# Patient Record
Sex: FEMALE | Race: WHITE | Employment: UNEMPLOYED | URBAN - METROPOLITAN AREA
[De-identification: names, ages, dates, MRNs, and addresses within clinical notes are randomized per-mention and may not be internally consistent; named-entity substitution may affect disease eponyms.]

---
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
## 2021-02-10 ENCOUNTER — OFFICE VISIT (OUTPATIENT)
Dept: FAMILY MEDICINE | Facility: CLINIC | Age: 41
End: 2021-02-10
Payer: COMMERCIAL

## 2021-02-10 VITALS
WEIGHT: 248 LBS | TEMPERATURE: 97.8 F | DIASTOLIC BLOOD PRESSURE: 78 MMHG | OXYGEN SATURATION: 100 % | SYSTOLIC BLOOD PRESSURE: 115 MMHG | HEIGHT: 67 IN | HEART RATE: 101 BPM | BODY MASS INDEX: 38.92 KG/M2

## 2021-02-10 DIAGNOSIS — Z30.432 ENCOUNTER FOR IUD REMOVAL: ICD-10-CM

## 2021-02-10 DIAGNOSIS — Z86.19 HISTORY OF CHLAMYDIA: ICD-10-CM

## 2021-02-10 DIAGNOSIS — F19.21 POLYSUBSTANCE DEPENDENCE IN EARLY, EARLY PARTIAL, SUSTAINED FULL, OR SUSTAINED PARTIAL REMISSION (H): Primary | ICD-10-CM

## 2021-02-10 DIAGNOSIS — Z13.9 SCREENING FOR CONDITION: ICD-10-CM

## 2021-02-10 DIAGNOSIS — R87.618 OTHER ABNORMAL CYTOLOGICAL FINDING OF SPECIMEN FROM CERVIX: ICD-10-CM

## 2021-02-10 DIAGNOSIS — Z86.19 HISTORY OF HPV INFECTION: ICD-10-CM

## 2021-02-10 DIAGNOSIS — G47.10 EXCESSIVE SLEEPINESS: ICD-10-CM

## 2021-02-10 PROBLEM — A74.9 CHLAMYDIA INFECTION: Status: ACTIVE | Noted: 2021-02-10

## 2021-02-10 PROBLEM — F19.10 POLYSUBSTANCE ABUSE (H): Status: ACTIVE | Noted: 2021-02-10

## 2021-02-10 LAB — HCG UR QL: NEGATIVE

## 2021-02-10 PROCEDURE — G0145 SCR C/V CYTO,THINLAYER,RESCR: HCPCS | Performed by: FAMILY MEDICINE

## 2021-02-10 PROCEDURE — 87591 N.GONORRHOEAE DNA AMP PROB: CPT | Performed by: FAMILY MEDICINE

## 2021-02-10 PROCEDURE — 86481 TB AG RESPONSE T-CELL SUSP: CPT | Performed by: FAMILY MEDICINE

## 2021-02-10 PROCEDURE — 87624 HPV HI-RISK TYP POOLED RSLT: CPT | Performed by: FAMILY MEDICINE

## 2021-02-10 PROCEDURE — 81025 URINE PREGNANCY TEST: CPT | Performed by: STUDENT IN AN ORGANIZED HEALTH CARE EDUCATION/TRAINING PROGRAM

## 2021-02-10 PROCEDURE — 86780 TREPONEMA PALLIDUM: CPT | Performed by: FAMILY MEDICINE

## 2021-02-10 PROCEDURE — 86706 HEP B SURFACE ANTIBODY: CPT | Performed by: FAMILY MEDICINE

## 2021-02-10 PROCEDURE — 99000 SPECIMEN HANDLING OFFICE-LAB: CPT | Performed by: FAMILY MEDICINE

## 2021-02-10 PROCEDURE — 36415 COLL VENOUS BLD VENIPUNCTURE: CPT | Performed by: FAMILY MEDICINE

## 2021-02-10 PROCEDURE — 87389 HIV-1 AG W/HIV-1&-2 AB AG IA: CPT | Performed by: FAMILY MEDICINE

## 2021-02-10 PROCEDURE — 86708 HEPATITIS A ANTIBODY: CPT | Performed by: FAMILY MEDICINE

## 2021-02-10 PROCEDURE — 99204 OFFICE O/P NEW MOD 45 MIN: CPT | Mod: 25 | Performed by: STUDENT IN AN ORGANIZED HEALTH CARE EDUCATION/TRAINING PROGRAM

## 2021-02-10 PROCEDURE — 87340 HEPATITIS B SURFACE AG IA: CPT | Performed by: FAMILY MEDICINE

## 2021-02-10 PROCEDURE — 86803 HEPATITIS C AB TEST: CPT | Performed by: FAMILY MEDICINE

## 2021-02-10 PROCEDURE — 87491 CHLMYD TRACH DNA AMP PROBE: CPT | Performed by: FAMILY MEDICINE

## 2021-02-10 PROCEDURE — 86709 HEPATITIS A IGM ANTIBODY: CPT | Performed by: FAMILY MEDICINE

## 2021-02-10 PROCEDURE — 58301 REMOVE INTRAUTERINE DEVICE: CPT | Mod: GC | Performed by: STUDENT IN AN ORGANIZED HEALTH CARE EDUCATION/TRAINING PROGRAM

## 2021-02-10 ASSESSMENT — MIFFLIN-ST. JEOR: SCORE: 1827.55

## 2021-02-10 NOTE — PROGRESS NOTES
Preceptor Attestation:    Patient seen and evaluated in person. I discussed the patient with the resident. I was present for and supervised the entire procedure. I have verified the content of the note, which accurately reflects my assessment of the patient and the plan of care.   Supervising Physician:  Johnnie Chowdhury MD.

## 2021-02-10 NOTE — LETTER
February 15, 2021      Anjali Coker  [address] [facility] [address]        Dear Anjali,    Thank you for getting your care at [facility]. Please see below for your test results. These results are all normal and reassuring. It does show that you are not immune to hepatitis B. I recommend that you complete the immunization series for this.    Resulted Orders   Quantiferon TB Gold Plus   Result Value Ref Range    MTB Quantiferon Result Negative NEG^Negative      Comment:      No interferon gamma response to M.tuberculosis antigens was detected.   Infection with M.tuberculosis is unlikely, however a single negative result   does not exclude infection. In patients at high risk for infection, a second   test should be considered      TB1 Ag minus Nil 0.05 IU/mL    TB2 Ag minus Nil 0.05 IU/mL    Mitogen minus Nil 9.95 IU/mL    NIL Result 0.05 IU/mL   HIV Antigen Antibody Combo   Result Value Ref Range    HIV Antigen Antibody Combo Nonreactive NR^Nonreactive          Comment:      HIV-1 p24 Ag & HIV-1/HIV-2 Ab Not Detected   Hepatitis B surface antigen   Result Value Ref Range    Hep B Surface Agn Nonreactive NR^Nonreactive   Hepatitis B Surface Antibody   Result Value Ref Range    Hepatitis B Surface Antibody 0.00 <8.00 m[IU]/mL      Comment:      Nonreactive, No antibody detected when the value is less than 8.00 m[IU]/mL.   Hepatitis C antibody   Result Value Ref Range    Hepatitis C Antibody Nonreactive NR^Nonreactive      Comment:      Assay performance characteristics have not been established for newborns,   infants, and children     HCG Qualitative Urine (UPT) ([facility])   Result Value Ref Range    HCG Qual Urine NEGATIVE Negative   Hepatitis A Antibody IgG   Result Value Ref Range    Hepatitis A Antibody IgG Nonreactive NR^Nonreactive      Comment:      This assay cannot be used for the diagnosis of acute HAV infection.   Hepatitis A antibody IgM   Result Value Ref Range    Hepatitis A IgM May  Nonreactive NR^Nonreactive      Comment:      IgM anti-HAV not detected. Does not exclude the possibility of recent exposure   to or infection with HAV.     Treponema Abs w Reflex to RPR and Titer   Result Value Ref Range    Treponema Antibodies Nonreactive NR^Nonreactive      Comment:      Methodology Change: Test performed on the [employer] Liaison XL by Treponema   pallidum Total Antibodies Assay as of 3.17.2020.     Pap imaged thin layer screen with HPV - recommended age 30 - 65 years (select HPV order below)   Result Value Ref Range    PAP NIL     Copath Report         Acc#: [id number]   Signed: 2/12/2021 14:03   MR#: [id number]    SPECIMEN/STAIN PROCESS:  Pap imaged thin layer prep screening (Surepath, FocalPoint with guided   screening)       Pap-Cyto x 1, HPV ordered x 1    SOURCE: Cervical, endocervical  ----------------------------------------------------------------   Pap imaged thin layer prep screening (Surepath, FocalPoint with guided   screening)  SPECIMEN ADEQUACY:  Satisfactory for evaluation.  -Transformation zone component absent.    CYTOLOGIC INTERPRETATION:    Negative for intraepithelial lesion or malignancy    Electronically signed by:  VIVEK Monroy (ASCP)    CLINICAL HISTORY:    Papanicolaou Test Limitations:  Cervical cytology is a screening test with   limited sensitivity; regular  screening is critical for cancer prevention; Pap tests are primarily   effective for the diagnosis/prevention of  squamous cell carcinoma, not adenocarcinomas or other cancers.    The technical component of this testing was completed at the [facility], with the professional component performed   at the [facility], [address] [place] [phone] ([phone])       Neisseria gonorrhoeae PCR   Result Value Ref Range    Specimen Descrip Cervical     N Gonorrhea PCR Negative  NEG^Negative      Comment:      Negative for N. gonorrhoeae rRNA by transcription mediated amplification.  A negative result by transcription mediated amplification does not preclude   the presence of N. gonorrhoeae infection because results are dependent on   proper and adequate collection, absence of inhibitors, and sufficient rRNA to   be detected.     Chlamydia trachomatis PCR   Result Value Ref Range    Specimen Description Cervical     Chlamydia Trachomatis PCR Negative NEG^Negative      Comment:      Negative for C. trachomatis rRNA by transcription mediated amplification.  A negative result by transcription mediated amplification does not preclude   the presence of C. trachomatis infection because results are dependent on   proper and adequate collection, absence of inhibitors, and sufficient rRNA to   be detected.         If you have any concerns about these results please call and leave a message for me or send a KAYAKt message to the clinic.    Sincerely,    Debra Beatty MD

## 2021-02-10 NOTE — LETTER
February 17, 2021      Anjali Coker  [address] [facility] [address]        Dear Anjali,    Thank you for getting your care at [facility]. Please see below for your test results. These results are normal and reassuring. Your next pap smear will be in 5 years.    Resulted Orders   Quantiferon TB Gold Plus   Result Value Ref Range    MTB Quantiferon Result Negative NEG^Negative      Comment:      No interferon gamma response to M.tuberculosis antigens was detected.   Infection with M.tuberculosis is unlikely, however a single negative result   does not exclude infection. In patients at high risk for infection, a second   test should be considered      TB1 Ag minus Nil 0.05 IU/mL    TB2 Ag minus Nil 0.05 IU/mL    Mitogen minus Nil 9.95 IU/mL    NIL Result 0.05 IU/mL   HIV Antigen Antibody Combo   Result Value Ref Range    HIV Antigen Antibody Combo Nonreactive NR^Nonreactive          Comment:      HIV-1 p24 Ag & HIV-1/HIV-2 Ab Not Detected   Hepatitis B surface antigen   Result Value Ref Range    Hep B Surface Agn Nonreactive NR^Nonreactive   Hepatitis B Surface Antibody   Result Value Ref Range    Hepatitis B Surface Antibody 0.00 <8.00 m[IU]/mL      Comment:      Nonreactive, No antibody detected when the value is less than 8.00 m[IU]/mL.   Hepatitis C antibody   Result Value Ref Range    Hepatitis C Antibody Nonreactive NR^Nonreactive      Comment:      Assay performance characteristics have not been established for newborns,   infants, and children     HCG Qualitative Urine (UPT) ([facility])   Result Value Ref Range    HCG Qual Urine NEGATIVE Negative   Hepatitis A Antibody IgG   Result Value Ref Range    Hepatitis A Antibody IgG Nonreactive NR^Nonreactive      Comment:      This assay cannot be used for the diagnosis of acute HAV infection.   Hepatitis A antibody IgM   Result Value Ref Range    Hepatitis A IgM May Nonreactive NR^Nonreactive      Comment:      IgM anti-HAV not detected. Does not exclude  the possibility of recent exposure   to or infection with HAV.     Treponema Abs w Reflex to RPR and Titer   Result Value Ref Range    Treponema Antibodies Nonreactive NR^Nonreactive      Comment:      Methodology Change: Test performed on the [employer] Liaison XL by Treponema   pallidum Total Antibodies Assay as of 3.17.2020.     Pap imaged thin layer screen with HPV - recommended age 30 - 65 years (select HPV order below)   Result Value Ref Range    PAP NIL     Copath Report         Acc#: [id number]   Signed: 2/12/2021 14:03   MR#: [id number]    SPECIMEN/STAIN PROCESS:  Pap imaged thin layer prep screening (Surepath, FocalPoint with guided   screening)       Pap-Cyto x 1, HPV ordered x 1    SOURCE: Cervical, endocervical  ----------------------------------------------------------------   Pap imaged thin layer prep screening (Surepath, FocalPoint with guided   screening)  SPECIMEN ADEQUACY:  Satisfactory for evaluation.  -Transformation zone component absent.    CYTOLOGIC INTERPRETATION:    Negative for intraepithelial lesion or malignancy    Electronically signed by:  VIVEK Monroy (ASCP)    CLINICAL HISTORY:    Papanicolaou Test Limitations:  Cervical cytology is a screening test with   limited sensitivity; regular  screening is critical for cancer prevention; Pap tests are primarily   effective for the diagnosis/prevention of  squamous cell carcinoma, not adenocarcinomas or other cancers.    The technical component of this testing was completed at the [facility], with the professional component performed   at the [facility], [address] [place] [phone] ([phone])       HPV High Risk Types DNA Cervical   Result Value Ref Range    HPV Source SurePath     HPV 16 DNA Negative NEG^Negative    HPV 18 DNA Negative NEG^Negative    Other HR HPV Negative NEG^Negative    Final  Diagnosis This patient's sample is negative for HPV DNA.       Comment:      This test was developed and its performance characteristics determined by the   [facility]. It   has not been cleared or approved by the FDA. The laboratory is regulated under   CLIA as qualified to perform high-complexity testing. This test is used for   clinical purposes. It should not be regarded as investigational or for   research.  (Note)  METHODOLOGY:  The Roche luís 4800 system uses automated extraction,   simultaneous amplification of HPV (L1 region) and beta-globin,    followed by  real time detection of fluorescent labeled HPV and beta   globin using specific oligonucleotide probes . The test specifically   identifies types HPV 16 DNA and HPV 18 DNA while concurrently   detecting the rest of the high risk types (31, 33, 35, 39, 45, 51,   52, 56, 58, 59, 66 or 68).  COMMENTS:  This test is not intended for use as a screening device   for women under age 30 with normal cervical cytology.  Results should   be correl ated with cytologic and histologic findings. Close clinical   followup is recommended.      Specimen Description Cervical Cells    Neisseria gonorrhoeae PCR   Result Value Ref Range    Specimen Descrip Cervical     N Gonorrhea PCR Negative NEG^Negative      Comment:      Negative for N. gonorrhoeae rRNA by transcription mediated amplification.  A negative result by transcription mediated amplification does not preclude   the presence of N. gonorrhoeae infection because results are dependent on   proper and adequate collection, absence of inhibitors, and sufficient rRNA to   be detected.     Chlamydia trachomatis PCR   Result Value Ref Range    Specimen Description Cervical     Chlamydia Trachomatis PCR Negative NEG^Negative      Comment:      Negative for C. trachomatis rRNA by transcription mediated amplification.  A negative result by transcription mediated  amplification does not preclude   the presence of C. trachomatis infection because results are dependent on   proper and adequate collection, absence of inhibitors, and sufficient rRNA to   be detected.         If you have any concerns about these results please call and leave a message for me or send a MyChart message to the clinic.    Sincerely,    Debra Beatty MD

## 2021-02-11 LAB
C TRACH DNA SPEC QL NAA+PROBE: NEGATIVE
HAV IGG SER QL IA: NONREACTIVE
HAV IGM SERPL QL IA: NONREACTIVE
HBV SURFACE AB SERPL IA-ACNC: 0 M[IU]/ML
HBV SURFACE AG SERPL QL IA: NONREACTIVE
HCV AB SERPL QL IA: NONREACTIVE
HIV 1+2 AB+HIV1 P24 AG SERPL QL IA: NONREACTIVE
N GONORRHOEA DNA SPEC QL NAA+PROBE: NEGATIVE
SPECIMEN SOURCE: NORMAL
SPECIMEN SOURCE: NORMAL
T PALLIDUM AB SER QL: NONREACTIVE

## 2021-02-12 ENCOUNTER — TELEPHONE (OUTPATIENT)
Dept: FAMILY MEDICINE | Facility: CLINIC | Age: 41
End: 2021-02-12

## 2021-02-12 LAB
COPATH REPORT: NORMAL
GAMMA INTERFERON BACKGROUND BLD IA-ACNC: 0.05 IU/ML
M TB IFN-G CD4+ BCKGRND COR BLD-ACNC: 9.95 IU/ML
M TB TUBERC IFN-G BLD QL: NEGATIVE
MITOGEN IGNF BCKGRD COR BLD-ACNC: 0.05 IU/ML
MITOGEN IGNF BCKGRD COR BLD-ACNC: 0.05 IU/ML
PAP: NORMAL

## 2021-02-12 NOTE — TELEPHONE ENCOUNTER
Rn spoke to Adela BETANCOURT at [facility] who was requesting results of patient's urine pregnancy test. RN informed her it did come back negative and it looks like we were waiting for a few other labs from 2/10 appointment. She is requesting results be faxed after interpretation from provider to [phone]. Routing to provider.   Shaneka Andrade RN

## 2021-02-16 ENCOUNTER — ANCILLARY PROCEDURE (OUTPATIENT)
Dept: ULTRASOUND IMAGING | Facility: CLINIC | Age: 41
End: 2021-02-16
Attending: FAMILY MEDICINE
Payer: COMMERCIAL

## 2021-02-16 DIAGNOSIS — Z30.432 ENCOUNTER FOR IUD REMOVAL: ICD-10-CM

## 2021-02-16 LAB
FINAL DIAGNOSIS: NORMAL
HPV HR 12 DNA CVX QL NAA+PROBE: NEGATIVE
HPV16 DNA SPEC QL NAA+PROBE: NEGATIVE
HPV18 DNA SPEC QL NAA+PROBE: NEGATIVE
RADIOLOGIST FLAGS: ABNORMAL
SPECIMEN DESCRIPTION: NORMAL
SPECIMEN SOURCE CVX/VAG CYTO: NORMAL

## 2021-02-16 PROCEDURE — 76830 TRANSVAGINAL US NON-OB: CPT | Mod: GC | Performed by: RADIOLOGY

## 2021-02-16 PROCEDURE — 76856 US EXAM PELVIC COMPLETE: CPT | Mod: GC | Performed by: RADIOLOGY

## 2021-02-17 DIAGNOSIS — T83.39XA RETAINED INTRAUTERINE CONTRACEPTIVE DEVICE (IUD): Primary | ICD-10-CM

## 2021-02-17 NOTE — RESULT ENCOUNTER NOTE
Please call patient with the following message:  Ignacio Alba, the ultrasound did show part of the IUD inside your cervix. I have placed a referral to GYN for removal of this fragment.  Thanks Debra Beatty MD

## 2021-03-03 ENCOUNTER — TELEPHONE (OUTPATIENT)
Dept: FAMILY MEDICINE | Facility: CLINIC | Age: 41
End: 2021-03-03

## 2021-03-07 ENCOUNTER — HEALTH MAINTENANCE LETTER (OUTPATIENT)
Age: 41
End: 2021-03-07

## 2021-03-10 VITALS — BODY MASS INDEX: 38.92 KG/M2 | WEIGHT: 248 LBS | HEIGHT: 67 IN

## 2021-03-10 RX ORDER — ARIPIPRAZOLE 10 MG/1
10 TABLET ORAL AT BEDTIME
Status: ON HOLD | COMMUNITY
End: 2021-04-11

## 2021-03-10 RX ORDER — VALACYCLOVIR HYDROCHLORIDE 500 MG/1
500 TABLET, FILM COATED ORAL EVERY EVENING
COMMUNITY

## 2021-03-10 RX ORDER — ATOMOXETINE 80 MG/1
80 CAPSULE ORAL DAILY
Status: ON HOLD | COMMUNITY
End: 2021-04-11

## 2021-03-10 RX ORDER — ESCITALOPRAM OXALATE 20 MG/1
20 TABLET ORAL AT BEDTIME
Status: ON HOLD | COMMUNITY
End: 2021-04-11

## 2021-03-10 ASSESSMENT — MIFFLIN-ST. JEOR: SCORE: 1832.67

## 2021-03-10 NOTE — PROGRESS NOTES
"Anjali Coker is a 41 year old female being evaluated via a billable telephone visit.     \"This telephone visit will be conducted via a call between you and your physician/provider. We have found that certain health care needs can be provided without the need for an in-person visit or physical exam.  This service lets us provide the care you need with a telephone conversation.  If a prescription is necessary we can send it directly to your pharmacy.  If lab work is needed we can place an order for that and you can then stop by our lab to have the test done at a later time.\"    Telephone visits are billed at different rates depending on your insurance coverage.  Please reach out to your insurance provider with any questions.    Patient has given verbal consent for  a Telephone visit? Yes    What telephone number would you like your provider to contact at at: Please call this number [phone] and staff will provide number to reach patient.    How would you like to obtain your AVS? Luz Styles MA    Telephone Visit Details:     Telephone Visit Start Time: 9:04 AM    Telephone Visit End Time:9:38 AM     Chief complaint: Consultation requested by Debra Beatty MD for evaluation of excessive daytime sleepiness    History of Present Illness: 41-year-old female with past medical history notable for bipolar depression, ADHD, substance abuse.  She is currently in residential treatment and has been sober from alcohol, meth, marijuana since December.  She reports many years of excessive daytime sleepiness.  She would doze off at work during quiet activities.  She has also been sleepy behind the wheel.  She was pulled over the last fall for crossing the median because of sleepiness.  She is currently getting 8-hour sleep opportunity nightly and despite this continues to be sleepy.  She feels she wakes up 2-4 times a night.  Usually once to go to the bathroom.  She has been told about very loud snoring.  Her "  has told her about apnea in the past.  She recalls waking up with a sense of gasping when she was pregnant.  But not lately.  She does wake up with dry mouth and sore throat and frequent headaches.  She is also been told about sleep talking but no sleepwalking or dream enactment behavior.    During the day she drinks the equivalent of about 4 cups of coffee plus some tea or sodas.  She also is currently on atomoxetine for ADHD.  She is taking naps 2-3 times a week for 30 to 60 minutes or so.  Naps are refreshing.  There is no cataplexy, hypnagogic hallucinations or sleep paralysis.  No restless legs.    She denies excessive shortness of breath during the day.  She does have some shortness of breath with activity that she feels is related to her fitness level.    [place] Sleepiness Scale   Sitting and reading: Moderate chance of dozing   Watching TV: Moderate chance of dozing   Sitting, inactive in a public place (e.g. a theatre or a meeting): Moderate chance of dozing   As a passenger in a car for an hour without a break: Slight chance of dozing   Lying down to rest in the afternoon when circumstances permit: High chance of dozing   Sitting and talking to someone: Moderate chance of dozing   Sitting quietly after a lunch without alcohol: High chance of dozing   In a car, while stopped for a few minutes in traffic: High chance of dozing   Total score - [place]: 18   (Less than 10 normal)    Insomnia Severity Scale  LIANNA Total Score: 14  Total score categories:  0-7 = No clinically significant insomnia   8-14 = Subthreshold insomnia   15-21 = Clinical insomnia (moderate severity)  22-28 = Clinical insomnia (severe)    STOP-BANG  Loud Snore   1  Excessively Tired/Sleepy   1  Observed apnea   1  Hypertension   0  BMI> 35 kg/m2   1  Age >50   0  Neck >16 in/40cm   0  Male Gender   0  Total =   4  (0-2 low, 3-4 intermediate, 5-8 high risk of ROMANA)      Past Medical History:   Diagnosis Date     Bipolar disorder (H)       Depressive disorder      Herpes 2000       Allergies   Allergen Reactions     Vistaril [Hydroxyzine]        Current Outpatient Medications   Medication     ARIPiprazole (ABILIFY) 15 MG ODT     atomoxetine (STRATTERA) 80 MG capsule     escitalopram (LEXAPRO) 20 MG tablet     valACYclovir (VALTREX) 500 MG tablet     No current facility-administered medications for this visit.        Social History     Socioeconomic History     Marital status: Single     Spouse name: Not on file     Number of children: Not on file     Years of education: Not on file     Highest education level: Not on file   Occupational History     Not on file   Social Needs     Financial resource strain: Not on file     Food insecurity     Worry: Not on file     Inability: Not on file     Transportation needs     Medical: Not on file     Non-medical: Not on file   Tobacco Use     Smoking status: Former Smoker     Packs/day: 1.00     Years: 20.00     Pack years: 20.00     Types: Cigarettes     Start date: 3/14/1996     Quit date: 2020     Years since quittin.1     Smokeless tobacco: Never Used   Substance and Sexual Activity     Alcohol use: Not Currently     Comment: sober since 2020     Drug use: Not Currently     Types: Marijuana, Methamphetamines     Comment: sober since 2020     Sexual activity: Not Currently     Partners: Male     Birth control/protection: I.U.D.   Lifestyle     Physical activity     Days per week: Not on file     Minutes per session: Not on file     Stress: Not on file   Relationships     Social connections     Talks on phone: Not on file     Gets together: Not on file     Attends Restorationism service: Not on file     Active member of club or organization: Not on file     Attends meetings of clubs or organizations: Not on file     Relationship status: Not on file     Intimate partner violence     Fear of current or ex partner: Not on file     Emotionally abused: Not on file     Physically abused: Not on file     " Forced sexual activity: Not on file   Other Topics Concern     Not on file   Social History Narrative     Not on file       Family History   Problem Relation Age of Onset     Cervical Cancer Mother      Chronic Obstructive Pulmonary Disease Mother      Sleep Apnea Mother      Dementia Maternal Grandmother      Sleep Apnea Maternal Grandmother      Lung Cancer Maternal Grandfather      Dementia Paternal Grandmother      Dementia Paternal Grandfather        Review of Systems: Positve for depression and anxiety and per HPI, otherwise comprehensive review of systems is negative.    EXAM:  Ht 1.702 m (5' 7.01\")   Wt 112.5 kg (248 lb)   BMI 38.83 kg/m    GENERAL: Aalert and no distress  RESP: No audible wheeze, cough, or visible cyanosis.  Able to speak in complete sentences.  PSYCH: Mentation appears normal, affect normal, judgement and insight intact, normal speech.     TSH 8/9/2017 normal 0.73    ASSESSMENT:  41-year-old female with excessive daytime sleepiness despite adequate sleep opportunity, snoring and observed apneas, obesity, history of substance abuse currently sober.  Bipolar depression is under reasonably good control at this time.  She also has a history of ADD and is on medication for this.  She has significant increased risk for obstructive sleep apnea with family history and symptoms and body habitus.    PLAN:  We reviewed the pathophysiology of obstructive sleep apnea, testing options, treatment options.  We also reviewed the importance of treating sleep apnea given her symptoms.  She is open to a trial of CPAP.  Orders generated for in lab polysomnography.  I will be contacting patient via [employer] with results when they become available.  We also discussed the impact of weight management on reducing severity and sometimes curing sleep apnea.  This could be reevaluated in the future after significant and sustained weight loss.    50 minutes spent on the date of the encounter doing chart review, " history and exam, documentation and further activities as noted above    Janet Bonilla M.D.  Pulmonary/Critical Care/Sleep Medicine    [facility]   Fl[address], [address]   [address] [place] [address]. [place] [address]   Appointments: [phone]    The above note was dictated using voice recognition software and may include typographical errors. Please contact the author for any clarifications.

## 2021-03-11 ENCOUNTER — VIRTUAL VISIT (OUTPATIENT)
Dept: SLEEP MEDICINE | Facility: CLINIC | Age: 41
End: 2021-03-11
Payer: COMMERCIAL

## 2021-03-11 DIAGNOSIS — G47.30 OBSERVED SLEEP APNEA: ICD-10-CM

## 2021-03-11 DIAGNOSIS — E66.09 CLASS 2 OBESITY DUE TO EXCESS CALORIES WITH BODY MASS INDEX (BMI) OF 38.0 TO 38.9 IN ADULT, UNSPECIFIED WHETHER SERIOUS COMORBIDITY PRESENT: ICD-10-CM

## 2021-03-11 DIAGNOSIS — E66.812 CLASS 2 OBESITY DUE TO EXCESS CALORIES WITH BODY MASS INDEX (BMI) OF 38.0 TO 38.9 IN ADULT, UNSPECIFIED WHETHER SERIOUS COMORBIDITY PRESENT: ICD-10-CM

## 2021-03-11 DIAGNOSIS — G47.10 EXCESSIVE SLEEPINESS: Primary | ICD-10-CM

## 2021-03-11 DIAGNOSIS — R06.83 SNORING: ICD-10-CM

## 2021-03-11 PROCEDURE — 99204 OFFICE O/P NEW MOD 45 MIN: CPT | Mod: 95 | Performed by: INTERNAL MEDICINE

## 2021-03-11 SDOH — HEALTH STABILITY: MENTAL HEALTH: HOW OFTEN DO YOU HAVE 6 OR MORE DRINKS ON ONE OCCASION?: NOT ASKED

## 2021-03-11 SDOH — HEALTH STABILITY: MENTAL HEALTH: HOW OFTEN DO YOU HAVE A DRINK CONTAINING ALCOHOL?: NOT ASKED

## 2021-03-11 SDOH — HEALTH STABILITY: MENTAL HEALTH: HOW MANY STANDARD DRINKS CONTAINING ALCOHOL DO YOU HAVE ON A TYPICAL DAY?: NOT ASKED

## 2021-03-11 NOTE — PATIENT INSTRUCTIONS
"MY TREATMENT INFORMATION FOR SLEEP APNEA-  Anjali Coker    DOCTOR : Janet Bonilla MD    Am I having a sleep study at a sleep center? Ordered  --->Due to insurance clearance delays, you will be contacted within 2-4 weeks to schedule    Am I having a home sleep study? Not Ordered  --->Watch the video for the device you are using:  /drop off device-   Https://www.Crossfader.com/watch?v=yGGFBdELGhk  Disposable device sent out require phone/computer application-   https://www.Crossfader.com/watch?v=BCce_vbiwxE    Please do not drive if sleepy.    Frequently asked questions:  1. What is Obstructive Sleep Apnea (ROMANA)? ROMANA is the most common type of sleep apnea. Apnea means, \"without breath.\"  Apnea is most often caused by narrowing or collapse of the upper airway as muscles relax during sleep.   Almost everyone has occasional apneas. Most people with sleep apnea have had brief interruptions at night frequently for many years.  The severity of sleep apnea is related to how frequent and severe the events are.   2. What are the consequences of ROMANA? Symptoms include: feeling sleepy during the day, snoring loudly, gasping or stopping of breathing, trouble sleeping, and occasionally morning headaches or heartburn at night.  Sleepiness can be serious and even increase the risk of falling asleep while driving. Other health consequences may include development of high blood pressure and other cardiovascular disease in persons who are susceptible. Untreated ROMANA  can contribute to heart disease, stroke and diabetes.   3. What are the treatment options? In most situations, sleep apnea is a lifelong disease that must be managed with daily therapy. Medications are not effective for sleep apnea and surgery is generally not considered until other therapies have been tried. Your treatment is your choice . Continuous Positive Airway (CPAP) works right away and is the therapy that is effective in nearly everyone. An oral device to " hold your jaw forward is usually the next most reliable option. Other options include postioning devices (to keep you off your back), weight loss, and surgery including a tongue pacing device. There is more detail about some of these options below.  4. Are my sleep studies covered by insurance? Although we will request verification of coverage, we advise you also check in advance of the study to ensure there is coverage.    Important tips for those choosing CPAP and similar devices   Know your equipment:  CPAP is continuous positive airway pressure that prevents obstructive sleep apnea by keeping the throat from collapsing while you are sleeping. In most cases, the device is  smart  and can slowly self-adjusts if your throat collapses and keeps a record every day of how well you are treated-this information is available to you and your care team.  BPAP is bilevel positive airway pressure that keeps your throat open and also assists each breath with a pressure boost to maintain adequate breathing.  Special kinds of BPAP are used in patients who have inadequate breathing from lung or heart disease. In most cases, the device is  smart  and can slowly self-adjusts to assist breathing. Like CPAP, the device keeps a record of how well you are treated.  Your mask is your connection to the device. You get to choose what feels most comfortable and the staff will help to make sure if fits. Here: are some examples of the different masks that are available:       Key points to remember on your journey with sleep apnea:  1. Sleep study.  PAP devices often need to be adjusted during a sleep study to show that they are effective and adjusted right.  2. Good tips to remember: Try wearing just the mask during a quiet time during the day so your body adapts to wearing it. A humidifier is recommended for comfort in most cases to prevent drying of your nose and throat. Allergy medication from your provider may help you if you are  having nasal congestion.  3. Getting settled-in. It takes more than one night for most of us to get used to wearing a mask. Try wearing just the mask during a quiet time during the day so your body adapts to wearing it. A humidifier is recommended for comfort in most cases. Our team will work with you carefully on the first day and will be in contact within 4 days and again at 2 and 4 weeks for advice and remote device adjustments. Your therapy is evaluated by the device each day.   4. Use it every night. The more you are able to sleep naturally for 7-8 hours, the more likely you will have good sleep and to prevent health risks or symptoms from sleep apnea. Even if you use it 4 hours it helps. Occasionally all of us are unable to use a medical therapy, in sleep apnea, it is not dangerous to miss one night.   5. Communicate. Call our skilled team on the number provided on the first day if your visit for problems that make it difficult to wear the device. Over 2 out of 3 patients can learn to wear the device long-term with help from our team. Remember to call our team or your sleep providers if you are unable to wear the device as we may have other solutions for those who cannot adapt to mask CPAP therapy. It is recommended that you sleep your sleep provider within the first 3 months and yearly after that if you are not having problems.   6. Use it for your health. We encourage use of CPAP masks during daytime quiet periods to allow your face and brain to adapt to the sensation of CPAP so that it will be a more natural sensation to awaken to at night or during naps. This can be very useful during the first few weeks or months of adapting to CPAP though it does not help medically to wear CPAP during wakefulness and  should not be used as a strategy just to meet guidelines.  7. Take care of your equipment. Make sure you clean your mask and tubing using directions every day and that your filter and mask are replaced as  recommended or if they are not working.     BESIDES CPAP, WHAT OTHER THERAPIES ARE THERE?    Positioning Device  Positioning devices are generally used when sleep apnea is mild and only occurs on your back.This example shows a pillow that straps around the waist. It may be appropriate for those whose sleep study shows milder sleep apnea that occurs primarily when lying flat on one's back. Preliminary studies have shown benefit but effectiveness at home may need to be verified by a home sleep test. These devices are generally not covered by medical insurance.  Examples of devices that maintain sleeping on the back to prevent snoring and mild sleep apnea.    Belt type body positioner  http://Wowsai/    Electronic reminder  http://nightshifttherapy.com/  http://[email or web address] Remedy.Traffline.au/      Oral Appliance  What is oral appliance therapy?  An oral appliance device fits on your teeth at night like a retainer used after having braces. The device is made by a specialized dentist and requires several visits over 1-2 months before a manufactured device is made to fit your teeth and is adjusted to prevent your sleep apnea. Once an oral device is working properly, snoring should be improved. A home sleep test may be recommended at that time if to determine whether the sleep apnea is adequately treated.       Some things to remember:  -Oral devices are often, but not always, covered by your medical insurance. Be sure to check with your insurance provider.   -If you are referred for oral therapy, you will be given a list of specialized dentists to consider or you may choose to visit the Web site of the American Academy of Dental Sleep Medicine  -Oral devices are less likely to work if you have severe sleep apnea or are extremely overweight.     More detailed information  An oral appliance is a small acrylic device that fits over the upper and lower teeth  (similar to a retainer or a mouth guard). This device slightly moves jaw  forward, which moves the base of the tongue forward, opens the airway, improves breathing for effective treat snoring and obstructive sleep apnea in perhaps 7 out of 10 people .  The best working devices are custom-made by a dental device  after a mold is made of the teeth 1, 2, 3.  When is an oral appliance indicated?  Oral appliance therapy is recommended as a first-line treatment for patients with primary snoring, mild sleep apnea, and for patients with moderate sleep apnea who prefer appliance therapy to use of CPAP4, 5. Severity of sleep apnea is determined by sleep testing and is based on the number of respiratory events per hour of sleep.   How successful is oral appliance therapy?  The success rate of oral appliance therapy in patients with mild sleep apnea is 75-80% while in patients with moderate sleep apnea it is 50-70%. The chance of success in patients with severe sleep apnea is 40-50%. The research also shows that oral appliances have a beneficial effect on the cardiovascular health of ROMANA patients at the same magnitude as CPAP therapy7.  Oral appliances should be a second-line treatment in cases of severe sleep apnea, but if not completely successful then a combination therapy utilizing CPAP plus oral appliance therapy may be effective. Oral appliances tend to be effective in a broad range of patients although studies show that the patients who have the highest success are females, younger patients, those with milder disease, and less severe obesity. 3, 6.   Finding a dentist that practices dental sleep medicine  Specific training is available through the American Academy of Dental Sleep Medicine for dentists interested in working in the field of sleep. To find a dentist who is educated in the field of sleep and the use of oral appliances, near you, visit the Web site of the American Academy of Dental Sleep Medicine.    References  1. domo Dykes al. Objectively measured vs  self-reported compliance during oral appliance therapy for sleep-disordered breathing. Chest 2013; 144(5): 5630-4538.  2. Emeli, et al. Objective measurement of compliance during oral appliance therapy for sleep-disordered breathing. Thorax 2013; 68(1): 91-96.  3. Lashonda et al. Mandibular advancement devices in 620 men and women with ROMANA and snoring: tolerability and predictors of treatment success. Chest 2004; 125: 1876-6141.  4. Roby et al. Oral appliances for snoring and ROMANA: a review. Sleep 2006; 29: 244-262.  5. Criselda et al. Oral appliance treatment for ROMANA: an update. J Clin Sleep Med 2014; 10(2): 215-227.  6. Romina et al. Predictors of OSAH treatment outcome. J Dent Res 2007; 86: 8342-9696.      Weight Loss:    Weight loss is a long-term strategy that may improve sleep apnea in some patients.    Weight management is a personal decision and the decision should be based on your interest and the potential benefits.  If you are interested in exploring weight loss strategies, the following discussion covers the impact on weight loss on sleep apnea and the approaches that may be successful.    Being overweight does not necessarily mean you will have health consequences.  Those who have BMI over 35 or over 27 with existing medical conditions carries greater risk.   Weight loss decreases severity of sleep apnea in most people with obesity. For those with mild obesity who have developed snoring with weight gain, even 15-30 pound weight loss can improve and occasionally eliminate sleep apnea.  Structured and life-long dietary and health habits are necessary to lose weight and keep healthier weight levels.     Though there may be significant health benefits from weight loss, long-term weight loss is very difficult to achieve- studies show success with dietary management in less than 10% of people. In addition, substantial weight loss may require years of dietary control and may be difficult if  patients have severe obesity. In these cases, surgical management may be considered.  Finally, older individuals who have tolerated obesity without health complications may be less likely to benefit from weight loss strategies.        Your BMI is Body mass index is 38.83 kg/m .  Weight management is a personal decision.  If you are interested in exploring weight loss strategies, the following discussion covers the approaches that may be successful. Body mass index (BMI) is one way to tell whether you are at a healthy weight, overweight, or obese. It measures your weight in relation to your height.  A BMI of 18.5 to 24.9 is in the healthy range. A person with a BMI of 25 to 29.9 is considered overweight, and someone with a BMI of 30 or greater is considered obese. More than two-thirds of American adults are considered overweight or obese.  Being overweight or obese increases the risk for further weight gain. Excess weight may lead to heart disease and diabetes.  Creating and following plans for healthy eating and physical activity may help you improve your health.  Weight control is part of healthy lifestyle and includes exercise, emotional health, and healthy eating habits. Careful eating habits lifelong are the mainstay of weight control. Though there are significant health benefits from weight loss, long-term weight loss with diet alone may be very difficult to achieve- studies show long-term success with dietary management in less than 10% of people. Attaining a healthy weight may be especially difficult to achieve in those with severe obesity. In some cases, medications, devices and surgical management might be considered.  What can you do?  If you are overweight or obese and are interested in methods for weight loss, you should discuss this with your provider.     Consider reducing daily calorie intake by 500 calories.     Keep a food journal.     Avoiding skipping meals, consider cutting portions  instead.    Diet combined with exercise helps maintain muscle while optimizing fat loss. Strength training is particularly important for building and maintaining muscle mass. Exercise helps reduce stress, increase energy, and improves fitness. Increasing exercise without diet control, however, may not burn enough calories to loose weight.       Start walking three days a week 10-20 minutes at a time    Work towards walking thirty minutes five days a week     Eventually, increase the speed of your walking for 1-2 minutes at time    In addition, we recommend that you review healthy lifestyles and methods for weight loss available through the National Institutes of Health patient information sites:  http://win.niddk.nih.gov/publications/index.htm    And look into health and wellness programs that may be available through your health insurance provider, employer, local community center, or seema club.    Weight management plan: Keep up the work with exercise and diet      Surgery:    Surgery for obstructive sleep apnea is considered generally only when other therapies fail to work. Surgery may be discussed with you if you are having a difficult time tolerating CPAP and or when there is an abnormal structure that requires surgical correction.  Nose and throat surgeries often enlarge the airway to prevent collapse.  Most of these surgeries create pain for 1-2 weeks and up to half of the most common surgeries are not effective throughout life.  You should carefully discuss the benefits and drawbacks to surgery with your sleep provider and surgeon to determine if it is the best solution for you.   More information  Surgery for ROMANA is directed at areas that are responsible for narrowing or complete obstruction of the airway during sleep.  There are a wide range of procedures available to enlarge and/or stabilize the airway to prevent blockage of breathing in the three major areas where it can occur: the palate, tongue, and  nasal regions.  Successful surgical treatment depends on the accurate identification of the factors responsible for obstructive sleep apnea in each person.  A personalized approach is required because there is no single treatment that works well for everyone.  Because of anatomic variation, consultation with an examination by a sleep surgeon is a critical first step in determining what surgical options are best for each patient.  In some cases, examination during sedation may be recommended in order to guide the selection of procedures.  Patients will be counseled about risks and benefits as well as the typical recovery course after surgery. Surgery is typically not a cure for a person s ROMANA.  However, surgery will often significantly improve one s ROMANA severity (termed  success rate ).  Even in the absence of a cure, surgery will decrease the cardiovascular risk associated with OSA7; improve overall quality of life8 (sleepiness, functionality, sleep quality, etc).      Palate Procedures:  Patients with ROMANA often have narrowing of their airway in the region of their tonsils and uvula.  The goals of palate procedures are to widen the airway in this region as well as to help the tissues resist collapse.  Modern palate procedure techniques focus on tissue conservation and soft tissue rearrangement, rather than tissue removal.  Often the uvula is preserved in this procedure. Residual sleep apnea is common in patient after pharyngoplasty with an average reduction in sleep apnea events of 33%2.      Tongue Procedures:  ExamWhile patients are awake, the muscles that surround the throat are active and keep this region open for breathing. These muscles relax during sleep, allowing the tongue and other structures to collapse and block breathing.  There are several different tongue procedures available.  Selection of a tongue base procedure depends on characteristics seen on physical exam.  Generally, procedures are aimed at  removing bulky tissues in this area or preventing the back of the tongue from falling back during sleep.  Success rates for tongue surgery range from 50-62%3.    Hypoglossal Nerve Stimulation:  Hypoglossal nerve stimulation has recently received approval from the [place] Food and Drug Administration for the treatment of obstructive sleep apnea.  This is based on research showing that the system was safe and effective in treating sleep apnea6.  Results showed that the median AHI score decreased 68%, from 29.3 to 9.0. This therapy uses an implant system that senses breathing patterns and delivers mild stimulation to airway muscles, which keeps the airway open during sleep.  The system consists of three fully implanted components: a small generator (similar in size to a pacemaker), a breathing sensor, and a stimulation lead.  Using a small handheld remote, a patient turns the therapy on before bed and off upon awakening.    Candidates for this device must be greater than 22 years of age, have moderate to severe ROMANA (AHI between 20-65), BMI less than 32, have tried CPAP/oral appliance without success, and have appropriate upper airway anatomy (determined by a sleep endoscopy performed by Dr. Justice).    Hypoglossal Nerve Stimulation Pathway:    The sleep surgeon s office will work with the patient through the insurance prior-authorization process (including communications and appeals).    Nasal Procedures:  Nasal obstruction can interfere with nasal breathing during the day and night.  Studies have shown that relief of nasal obstruction can improve the ability of some patients to tolerate positive airway pressure therapy for obstructive sleep apnea1.  Treatment options include medications such as nasal saline, topical corticosteroid and antihistamine sprays, and oral medications such as antihistamines or decongestants. Non-surgical treatments can include external nasal dilators for selected patients. If these are  not successful by themselves, surgery can improve the nasal airway either alone or in combination with these other options.      Combination Procedures:  Combination of surgical procedures and other treatments may be recommended, particularly if patients have more than one area of narrowing or persistent positional disease.  The success rate of combination surgery ranges from 66-80%2,3.    References  1. Joe SIMS. The Role of the Nose in Snoring and Obstructive Sleep Apnoea: An Update.  Eur Arch Otorhinolaryngol. 2011; 268: 1365-73.  2.  Divina SM; Pan JA; More JR; Pallanch JF; Yulisa MB; Yoav SG; Eula AKBAR. Surgical modifications of the upper airway for obstructive sleep apnea in adults: a systematic review and meta-analysis. SLEEP 2010;33(10):8178-8920. Aas SILVERIO. Hypopharyngeal surgery in obstructive sleep apnea: an evidence-based medicine review.  Arch Otolaryngol Head Neck Surg. 2006 Feb;132(2):206-13.  3. Jett YH1, Evelyn Y, Osmar GABE. The efficacy of anatomically based multilevel surgery for obstructive sleep apnea. Otolaryngol Head Neck Surg. 2003 Oct;129(4):327-35.  4. Asa SILVERIO, Goldberg A. Hypopharyngeal Surgery in Obstructive Sleep Apnea: An Evidence-Based Medicine Review. Arch Otolaryngol Head Neck Surg. 2006 Feb;132(2):206-13.  5. Loyda PJ et al. Upper-Airway Stimulation for Obstructive Sleep Apnea.  N Engl J Med. 2014 Jan 9;370(2):139-49.  6. Oniel Y et al. Increased Incidence of Cardiovascular Disease in Middle-aged Men with Obstructive Sleep Apnea. Am J Respir Crit Care Med; 2002 166: 159-165  7. Brambila EM et al. Studying Life Effects and Effectiveness of Palatopharyngoplasty (SLEEP) study: Subjective Outcomes of Isolated Uvulopalatopharyngoplasty. Otolaryngol Head Neck Surg. 2011; 144: 623-631.

## 2021-03-11 NOTE — LETTER
"    3/11/2021         RE: Anjali Coker  [address] [facility] [address]        Dear Colleague,    Thank you for referring your patient, Anjali Coker, to the [facility]. Please see a copy of my visit note below.    Anjali Coker is a 41 year old female being evaluated via a billable telephone visit.     \"This telephone visit will be conducted via a call between you and your physician/provider. We have found that certain health care needs can be provided without the need for an in-person visit or physical exam.  This service lets us provide the care you need with a telephone conversation.  If a prescription is necessary we can send it directly to your pharmacy.  If lab work is needed we can place an order for that and you can then stop by our lab to have the test done at a later time.\"    Telephone visits are billed at different rates depending on your insurance coverage.  Please reach out to your insurance provider with any questions.    Patient has given verbal consent for  a Telephone visit? Yes    What telephone number would you like your provider to contact at at: Please call this number [phone] and staff will provide number to reach patient.    How would you like to obtain your AVS? Luz Styles MA    Telephone Visit Details:     Telephone Visit Start Time: 9:04 AM    Telephone Visit End Time:9:38 AM     Chief complaint: Consultation requested by Debra Beatty MD for evaluation of excessive daytime sleepiness    History of Present Illness: 41-year-old female with past medical history notable for bipolar depression, ADHD, substance abuse.  She is currently in residential treatment and has been sober from alcohol, meth, marijuana since December.  She reports many years of excessive daytime sleepiness.  She would doze off at work during quiet activities.  She has also been sleepy behind the wheel.  She was pulled over the last fall for crossing the median " because of sleepiness.  She is currently getting 8-hour sleep opportunity nightly and despite this continues to be sleepy.  She feels she wakes up 2-4 times a night.  Usually once to go to the bathroom.  She has been told about very loud snoring.  Her  has told her about apnea in the past.  She recalls waking up with a sense of gasping when she was pregnant.  But not lately.  She does wake up with dry mouth and sore throat and frequent headaches.  She is also been told about sleep talking but no sleepwalking or dream enactment behavior.    During the day she drinks the equivalent of about 4 cups of coffee plus some tea or sodas.  She also is currently on atomoxetine for ADHD.  She is taking naps 2-3 times a week for 30 to 60 minutes or so.  Naps are refreshing.  There is no cataplexy, hypnagogic hallucinations or sleep paralysis.  No restless legs.    She denies excessive shortness of breath during the day.  She does have some shortness of breath with activity that she feels is related to her fitness level.    [place] Sleepiness Scale   Sitting and reading: Moderate chance of dozing   Watching TV: Moderate chance of dozing   Sitting, inactive in a public place (e.g. a theatre or a meeting): Moderate chance of dozing   As a passenger in a car for an hour without a break: Slight chance of dozing   Lying down to rest in the afternoon when circumstances permit: High chance of dozing   Sitting and talking to someone: Moderate chance of dozing   Sitting quietly after a lunch without alcohol: High chance of dozing   In a car, while stopped for a few minutes in traffic: High chance of dozing   Total score - [place]mass: 18   (Less than 10 normal)    Insomnia Severity Scale  LIANNA Total Score: 14  Total score categories:  0-7 = No clinically significant insomnia   8-14 = Subthreshold insomnia   15-21 = Clinical insomnia (moderate severity)  22-28 = Clinical insomnia (severe)    STOP-BANG  Loud Snore   1  Excessively  Tired/Sleepy   1  Observed apnea   1  Hypertension   0  BMI> 35 kg/m2   1  Age >50   0  Neck >16 in/40cm   0  Male Gender   0  Total =   4  (0-2 low, 3-4 intermediate, 5-8 high risk of ROMANA)      Past Medical History:   Diagnosis Date     Bipolar disorder (H)      Depressive disorder      Herpes 2000       Allergies   Allergen Reactions     Vistaril [Hydroxyzine]        Current Outpatient Medications   Medication     ARIPiprazole (ABILIFY) 15 MG ODT     atomoxetine (STRATTERA) 80 MG capsule     escitalopram (LEXAPRO) 20 MG tablet     valACYclovir (VALTREX) 500 MG tablet     No current facility-administered medications for this visit.        Social History     Socioeconomic History     Marital status: Single     Spouse name: Not on file     Number of children: Not on file     Years of education: Not on file     Highest education level: Not on file   Occupational History     Not on file   Social Needs     Financial resource strain: Not on file     Food insecurity     Worry: Not on file     Inability: Not on file     Transportation needs     Medical: Not on file     Non-medical: Not on file   Tobacco Use     Smoking status: Former Smoker     Packs/day: 1.00     Years: 20.00     Pack years: 20.00     Types: Cigarettes     Start date: 3/14/1996     Quit date: 2020     Years since quittin.1     Smokeless tobacco: Never Used   Substance and Sexual Activity     Alcohol use: Not Currently     Comment: sober since 2020     Drug use: Not Currently     Types: Marijuana, Methamphetamines     Comment: sober since 2020     Sexual activity: Not Currently     Partners: Male     Birth control/protection: I.U.D.   Lifestyle     Physical activity     Days per week: Not on file     Minutes per session: Not on file     Stress: Not on file   Relationships     Social connections     Talks on phone: Not on file     Gets together: Not on file     Attends Mandaen service: Not on file     Active member of club or organization:  "Not on file     Attends meetings of clubs or organizations: Not on file     Relationship status: Not on file     Intimate partner violence     Fear of current or ex partner: Not on file     Emotionally abused: Not on file     Physically abused: Not on file     Forced sexual activity: Not on file   Other Topics Concern     Not on file   Social History Narrative     Not on file       Family History   Problem Relation Age of Onset     Cervical Cancer Mother      Chronic Obstructive Pulmonary Disease Mother      Sleep Apnea Mother      Dementia Maternal Grandmother      Sleep Apnea Maternal Grandmother      Lung Cancer Maternal Grandfather      Dementia Paternal Grandmother      Dementia Paternal Grandfather        Review of Systems: Positve for depression and anxiety and per HPI, otherwise comprehensive review of systems is negative.    EXAM:  Ht 1.702 m (5' 7.01\")   Wt 112.5 kg (248 lb)   BMI 38.83 kg/m    GENERAL: Aalert and no distress  RESP: No audible wheeze, cough, or visible cyanosis.  Able to speak in complete sentences.  PSYCH: Mentation appears normal, affect normal, judgement and insight intact, normal speech.     TSH 8/9/2017 normal 0.73    ASSESSMENT:  41-year-old female with excessive daytime sleepiness despite adequate sleep opportunity, snoring and observed apneas, obesity, history of substance abuse currently sober.  Bipolar depression is under reasonably good control at this time.  She also has a history of ADD and is on medication for this.  She has significant increased risk for obstructive sleep apnea with family history and symptoms and body habitus.    PLAN:  We reviewed the pathophysiology of obstructive sleep apnea, testing options, treatment options.  We also reviewed the importance of treating sleep apnea given her symptoms.  She is open to a trial of CPAP.  Orders generated for in lab polysomnography.  I will be contacting patient via FIRE1 with results when they become available.  We " also discussed the impact of weight management on reducing severity and sometimes curing sleep apnea.  This could be reevaluated in the future after significant and sustained weight loss.    50 minutes spent on the date of the encounter doing chart review, history and exam, documentation and further activities as noted above    Janet Bonilla M.D.  Pulmonary/Critical Care/Sleep Medicine    [facility]   [address], [address]   [address] [place] [address]   Appointments: [phone]    The above note was dictated using voice recognition software and may include typographical errors. Please contact the author for any clarifications.                      Again, thank you for allowing me to participate in the care of your patient.        Sincerely,        Janet Bonilla MD

## 2021-03-18 NOTE — PROGRESS NOTES
[facility]  Gynecology Consult Visit    SUBJECTIVE    Anjali Coker is a 41 year old  who is here for a retained IUD fragment after in office removal. Anjali states that she had a Paragard IUD placed 3 years ago. At her routine visit with her primary care provider on , during her pap smear, her provider thought that the IUD was malpositioned (per note - visible in cervical os), so performed/recommended removal. Unfortunately, during removal, the arm fractured. Since then, Anjali has not experienved pain or heavy menses or irregular bleeding; in fact, her periods have been shorter than usual. She is not currently using other methods of contraception, and did have unprotected intercourse in February. She did take a pregnancy test earlier this month which was negative.     Her LMP is: Patient's last menstrual period was 2021.    PAST MEDICAL HISTORY  Past Medical History:   Diagnosis Date     Bipolar disorder (H)      Depressive disorder      Herpes 2000       MEDICATIONS  Current Outpatient Medications   Medication     ARIPiprazole (ABILIFY) 15 MG ODT     atomoxetine (STRATTERA) 80 MG capsule     escitalopram (LEXAPRO) 20 MG tablet     valACYclovir (VALTREX) 500 MG tablet     No current facility-administered medications for this visit.        ALLERGIES  Allergies   Allergen Reactions     Vistaril [Hydroxyzine]        OBSTETRIC/GYNECOLOGIC HISTORY  Menses history as above in HPI  Lab Results   Component Value Date    PAP NIL 02/10/2021    HPV negative  2/10/21  History of abnormal Pap smear: No - has had HPV in the past, had colposcopies but no LEEP/cone    OB History    Para Term  AB Living   6 4 4 0 0 2   SAB TAB Ectopic Multiple Live Births   0 0 0 0 2      # Outcome Date GA Lbr Álvaro/2nd Weight Sex Delivery Anes PTL Lv   6             5             4 Term      Vag-Spont      3 Term      Vag-Spont      2 Term      Vag-Spont   LAMBERTO   1 Term      Vag-Spont   LAMBERTO  "     Complications: Preeclampsia/Hypertension       PAST SURGICAL HISTORY   Past Surgical History:   Procedure Laterality Date     sponaneous    ,        SOCIAL HISTORY  Social History     Tobacco Use     Smoking status: Former Smoker     Packs/day: 1.00     Years: 20.00     Pack years: 20.00     Types: Cigarettes     Start date: 3/14/1996     Quit date: 2020     Years since quittin.2     Smokeless tobacco: Never Used   Substance Use Topics     Alcohol use: Not Currently     Comment: sober since 2020     Drug use: Not Currently     Types: Marijuana, Methamphetamines     Comment: sober since 2020       FAMILY HISTORY  Family History   Problem Relation Age of Onset     Cervical Cancer Mother      Chronic Obstructive Pulmonary Disease Mother      Sleep Apnea Mother      Dementia Maternal Grandmother      Sleep Apnea Maternal Grandmother      Lung Cancer Maternal Grandfather      Dementia Paternal Grandmother      Dementia Paternal Grandfather        REVIEW OF SYSTEMS  A 10 point review of systems including Constitutional, Eyes, Respiratory, Cardiovascular, Gastroenterology, Genitourinary, Integumentary, Musculoskeletal, and Psychiatric, were all negative, except for pertinent positives noted in the above HPI.    OBJECTIVE  /85   Pulse 103   Ht 1.702 m (5' 7.01\")   Wt 120.4 kg (265 lb 8 oz)   LMP 2021   BMI 41.57 kg/m      General: Alert, without distress  HEENT: normocephalic, without obvious abnormality   Cardiovascular: regular rate    Lungs: normal work of breathing   Abdomen: soft, non-tender, non-distended   Pelvic: normal external female genitalia; normal vagina without discharge; normal cervix without lesions/masses; no visible or palpable IUD fragment; uterus small, mobile, nontender; adnexae nontender and without masses; normal anus/perineum   Extremities: normal    IMAGING:  Pelvic Ultrasound 21:  IMPRESSION:   1. Linear echogenic and shadowing focus in " the cervical canal,  compatible with suspected retained IUD fragment.  2. Normal ultrasound of the remaining pelvis.    ASSESSMENT  Anjali Coker is a 41 year old  who is here with a retained IUD fragment.    PLAN    I discussed with Anjali that the IUD fragment/arm appears to be transverse/perpendicularly sitting in the cervical canal, possibly partially embedded. I could not visualize or palpate the IUD on exam. I therefore recommended hysteroscopic removal, possibly with resection with the morcellator to free the embedded arm. Discussed risks of cervical/uterine perforation, infection, bleeding, need for additional surgery including hysterectomy, failure/inability to safely remove the IUD fragment.  Questions answered. She is in agreement with the plan. Discussed need for pre-op physical within 30d of surgery, COVID test within 96h of surgery. Reviewed expected recovery/return to work after procedure.    Return to the office for postop visit.    Chelsey Steele MD, MSCI    [facility]/OBGYN

## 2021-03-19 ENCOUNTER — OFFICE VISIT (OUTPATIENT)
Dept: OBGYN | Facility: CLINIC | Age: 41
End: 2021-03-19
Attending: OBSTETRICS & GYNECOLOGY
Payer: COMMERCIAL

## 2021-03-19 VITALS
SYSTOLIC BLOOD PRESSURE: 126 MMHG | BODY MASS INDEX: 41.67 KG/M2 | DIASTOLIC BLOOD PRESSURE: 85 MMHG | WEIGHT: 265.5 LBS | HEART RATE: 103 BPM | HEIGHT: 67 IN

## 2021-03-19 DIAGNOSIS — T83.39XA RETAINED INTRAUTERINE CONTRACEPTIVE DEVICE (IUD): Primary | ICD-10-CM

## 2021-03-19 PROCEDURE — 99203 OFFICE O/P NEW LOW 30 MIN: CPT | Performed by: OBSTETRICS & GYNECOLOGY

## 2021-03-19 RX ORDER — ACETAMINOPHEN 325 MG/1
975 TABLET ORAL ONCE
Status: CANCELLED | OUTPATIENT
Start: 2021-03-19 | End: 2021-03-19

## 2021-03-19 ASSESSMENT — ANXIETY QUESTIONNAIRES
5. BEING SO RESTLESS THAT IT IS HARD TO SIT STILL: NOT AT ALL
GAD7 TOTAL SCORE: 2
7. FEELING AFRAID AS IF SOMETHING AWFUL MIGHT HAPPEN: NOT AT ALL
1. FEELING NERVOUS, ANXIOUS, OR ON EDGE: SEVERAL DAYS
2. NOT BEING ABLE TO STOP OR CONTROL WORRYING: NOT AT ALL
6. BECOMING EASILY ANNOYED OR IRRITABLE: NOT AT ALL
3. WORRYING TOO MUCH ABOUT DIFFERENT THINGS: SEVERAL DAYS

## 2021-03-19 ASSESSMENT — PATIENT HEALTH QUESTIONNAIRE - PHQ9
5. POOR APPETITE OR OVEREATING: NOT AT ALL
SUM OF ALL RESPONSES TO PHQ QUESTIONS 1-9: 2

## 2021-03-19 ASSESSMENT — MIFFLIN-ST. JEOR: SCORE: 1912.08

## 2021-03-19 NOTE — LETTER
3/19/2021       RE: Anjali Coker  [address] [facility] [address]     Dear Colleague,    Thank you for referring your patient, Anjali Coker, to the [facility] [place] at [facility]. Please see a copy of my visit note below.    [facility]  Gynecology Consult Visit    SUBJECTIVE    Anjali Coker is a 41 year old  who is here for a retained IUD fragment after in office removal. Anjali states that she had a Paragard IUD placed 3 years ago. At her routine visit with her primary care provider on , during her pap smear, her provider thought that the IUD was malpositioned (per note - visible in cervical os), so performed/recommended removal. Unfortunately, during removal, the arm fractured. Since then, Anjali has not experienved pain or heavy menses or irregular bleeding; in fact, her periods have been shorter than usual. She is not currently using other methods of contraception, and did have unprotected intercourse in February. She did take a pregnancy test earlier this month which was negative.     Her LMP is: Patient's last menstrual period was 2021.    PAST MEDICAL HISTORY  Past Medical History:   Diagnosis Date     Bipolar disorder (H)      Depressive disorder      Herpes 2000       MEDICATIONS  Current Outpatient Medications   Medication     ARIPiprazole (ABILIFY) 15 MG ODT     atomoxetine (STRATTERA) 80 MG capsule     escitalopram (LEXAPRO) 20 MG tablet     valACYclovir (VALTREX) 500 MG tablet     No current facility-administered medications for this visit.        ALLERGIES  Allergies   Allergen Reactions     Vistaril [Hydroxyzine]        OBSTETRIC/GYNECOLOGIC HISTORY  Menses history as above in HPI  Lab Results   Component Value Date    PAP NIL 02/10/2021    HPV negative  2/10/21  History of abnormal Pap smear: No - has had HPV in the past, had colposcopies but no LEEP/cone    OB History    Para Term  " AB Living   6 4 4 0 0 2   SAB TAB Ectopic Multiple Live Births   0 0 0 0 2      # Outcome Date GA Lbr Álvaro/2nd Weight Sex Delivery Anes PTL Lv   6             5             4 Term      Vag-Spont      3 Term      Vag-Spont      2 Term      Vag-Spont   LAMBERTO   1 Term      Vag-Spont   LAMBERTO      Complications: Preeclampsia/Hypertension       PAST SURGICAL HISTORY   Past Surgical History:   Procedure Laterality Date     sponaneous    ,        SOCIAL HISTORY  Social History     Tobacco Use     Smoking status: Former Smoker     Packs/day: 1.00     Years: 20.00     Pack years: 20.00     Types: Cigarettes     Start date: 3/14/1996     Quit date: 2020     Years since quittin.2     Smokeless tobacco: Never Used   Substance Use Topics     Alcohol use: Not Currently     Comment: sober since 2020     Drug use: Not Currently     Types: Marijuana, Methamphetamines     Comment: sober since 2020       FAMILY HISTORY  Family History   Problem Relation Age of Onset     Cervical Cancer Mother      Chronic Obstructive Pulmonary Disease Mother      Sleep Apnea Mother      Dementia Maternal Grandmother      Sleep Apnea Maternal Grandmother      Lung Cancer Maternal Grandfather      Dementia Paternal Grandmother      Dementia Paternal Grandfather        REVIEW OF SYSTEMS  A 10 point review of systems including Constitutional, Eyes, Respiratory, Cardiovascular, Gastroenterology, Genitourinary, Integumentary, Musculoskeletal, and Psychiatric, were all negative, except for pertinent positives noted in the above HPI.    OBJECTIVE  /85   Pulse 103   Ht 1.702 m (5' 7.01\")   Wt 120.4 kg (265 lb 8 oz)   LMP 2021   BMI 41.57 kg/m      General: Alert, without distress  HEENT: normocephalic, without obvious abnormality   Cardiovascular: regular rate    Lungs: normal work of breathing   Abdomen: soft, non-tender, non-distended   Pelvic: normal external female genitalia; normal vagina " without discharge; normal cervix without lesions/masses; no visible or palpable IUD fragment; uterus small, mobile, nontender; adnexae nontender and without masses; normal anus/perineum   Extremities: normal    IMAGING:  Pelvic Ultrasound 21:  IMPRESSION:   1. Linear echogenic and shadowing focus in the cervical canal,  compatible with suspected retained IUD fragment.  2. Normal ultrasound of the remaining pelvis.    ASSESSMENT  Anjali Coker is a 41 year old  who is here with a retained IUD fragment.    PLAN    I discussed with Anjali that the IUD fragment/arm appears to be transverse/perpendicularly sitting in the cervical canal, possibly partially embedded. I could not visualize or palpate the IUD on exam. I therefore recommended hysteroscopic removal, possibly with resection with the morcellator to free the embedded arm. Discussed risks of cervical/uterine perforation, infection, bleeding, need for additional surgery including hysterectomy, failure/inability to safely remove the IUD fragment.  Questions answered. She is in agreement with the plan. Discussed need for pre-op physical within 30d of surgery, COVID test within 96h of surgery. Reviewed expected recovery/return to work after procedure.    Return to the office for postop visit.    Chelsey Steele MD, MSCI    [facility]

## 2021-03-20 ASSESSMENT — ANXIETY QUESTIONNAIRES: GAD7 TOTAL SCORE: 2

## 2021-03-22 ENCOUNTER — TELEPHONE (OUTPATIENT)
Dept: OBGYN | Facility: CLINIC | Age: 41
End: 2021-03-22

## 2021-03-22 PROBLEM — T83.39XA RETAINED INTRAUTERINE CONTRACEPTIVE DEVICE (IUD): Status: ACTIVE | Noted: 2021-03-22

## 2021-03-22 NOTE — TELEPHONE ENCOUNTER
Scheduled surgery with patient coordinator, 5/5/21, arrival time at 7:00a.m with nothing to eat eight hours before scheduled surgery time, clear liquids up to two hours before, h&p required within thirty days of surgery, COVID testing 96 hours prior, map and letter mailed out.     to complete the following fields:            CHECKLIST     Google Calendar : Yes     Resident notified: Not Applicable     Clinic schedule blocked:  Not Applicable    Patient notified:Yes      Pre op information sent: Yes     Given to patient over the phone.Yes    Comments:

## 2021-04-07 ENCOUNTER — OFFICE VISIT (OUTPATIENT)
Dept: FAMILY MEDICINE | Facility: CLINIC | Age: 41
End: 2021-04-07
Payer: COMMERCIAL

## 2021-04-07 VITALS
TEMPERATURE: 98.6 F | HEART RATE: 112 BPM | WEIGHT: 271.4 LBS | OXYGEN SATURATION: 96 % | BODY MASS INDEX: 41.13 KG/M2 | HEIGHT: 68 IN | RESPIRATION RATE: 16 BRPM | DIASTOLIC BLOOD PRESSURE: 86 MMHG | SYSTOLIC BLOOD PRESSURE: 126 MMHG

## 2021-04-07 DIAGNOSIS — Z23 ENCOUNTER FOR IMMUNIZATION: ICD-10-CM

## 2021-04-07 DIAGNOSIS — T83.39XD OTHER MECHANICAL COMPLICATION OF INTRAUTERINE CONTRACEPTIVE DEVICE, SUBSEQUENT ENCOUNTER: ICD-10-CM

## 2021-04-07 DIAGNOSIS — Z78.9 NOT IMMUNE TO HEPATITIS B VIRUS: ICD-10-CM

## 2021-04-07 DIAGNOSIS — Z01.818 PREOPERATIVE EXAMINATION: Primary | ICD-10-CM

## 2021-04-07 LAB
ERYTHROCYTE [DISTWIDTH] IN BLOOD BY AUTOMATED COUNT: 12.3 % (ref 10–15)
HCT VFR BLD AUTO: 40.7 % (ref 35–47)
HGB BLD-MCNC: 13.1 G/DL (ref 11.7–15.7)
MCH RBC QN AUTO: 30.3 PG (ref 26.5–33)
MCHC RBC AUTO-ENTMCNC: 32.2 G/DL (ref 31.5–36.5)
MCV RBC AUTO: 94 FL (ref 78–100)
PLATELET # BLD AUTO: 224 10E9/L (ref 150–450)
RBC # BLD AUTO: 4.33 10E12/L (ref 3.8–5.2)
WBC # BLD AUTO: 7.2 10E9/L (ref 4–11)

## 2021-04-07 PROCEDURE — 99213 OFFICE O/P EST LOW 20 MIN: CPT | Mod: 25 | Performed by: STUDENT IN AN ORGANIZED HEALTH CARE EDUCATION/TRAINING PROGRAM

## 2021-04-07 PROCEDURE — 85027 COMPLETE CBC AUTOMATED: CPT | Performed by: FAMILY MEDICINE

## 2021-04-07 PROCEDURE — 90746 HEPB VACCINE 3 DOSE ADULT IM: CPT | Performed by: STUDENT IN AN ORGANIZED HEALTH CARE EDUCATION/TRAINING PROGRAM

## 2021-04-07 PROCEDURE — 36415 COLL VENOUS BLD VENIPUNCTURE: CPT | Performed by: FAMILY MEDICINE

## 2021-04-07 PROCEDURE — 90471 IMMUNIZATION ADMIN: CPT | Performed by: STUDENT IN AN ORGANIZED HEALTH CARE EDUCATION/TRAINING PROGRAM

## 2021-04-07 RX ORDER — ACETAMINOPHEN 500 MG
500-1000 TABLET ORAL EVERY 6 HOURS PRN
Status: ON HOLD | COMMUNITY
End: 2021-05-05

## 2021-04-07 RX ORDER — IBUPROFEN 200 MG
200 TABLET ORAL EVERY 4 HOURS PRN
Status: ON HOLD | COMMUNITY
End: 2021-05-05

## 2021-04-07 ASSESSMENT — MIFFLIN-ST. JEOR: SCORE: 1954.56

## 2021-04-07 NOTE — PROGRESS NOTES
[address] [address]  Phone: [phone]  Fax: [phone]  Primary Provider: Debra Robertson  Pre-op Performing Provider: DEBRA ROBERTSON      PREOPERATIVE EVALUATION:  Today's date: 4/7/2021    Anjali Coker is a 41 year old female who presents for a preoperative evaluation.    Surgical Information:  Surgery/Procedure: HYSTEROSCOPY, WITH DILATION AND CURETTAGE OF UTERUS, possibly USING MORCELLATOR  Surgery Location: [facility]  Surgeon: Dr. Steele  Surgery Date: 5/5/2021  Time of Surgery: 9:45 am   Where patient plans to recover: At home with family  Fax number for surgical facility: Note does not need to be faxed, will be available electronically in Epic.    Type of Anesthesia Anticipated: General    Assessment & Plan     The proposed surgical procedure is considered LOW risk.    Preoperative examination  Other mechanical complication of intrauterine contraceptive device, subsequent encounter  Hx of anemia. Will check CBC.   - CBC with platelets    Not immune to hepatitis B virus  1st dose of shot series given today.      Possible Sleep Apnea:    PSG scheduled     Risks and Recommendations:  The patient has the following additional risks and recommendations for perioperative complications:   - Morbid obesity (BMI >40)    Medication Instructions:  Patient is to take all scheduled medications on the day of surgery    RECOMMENDATION:  APPROVAL GIVEN to proceed with proposed procedure, without further diagnostic evaluation.  [id number]}    Subjective     HPI related to upcoming procedure: retained IUD fragment.    Preop Questions 4/7/2021   1. Have you ever had a heart attack or stroke? No   2. Have you ever had surgery on your heart or blood vessels, such as a stent placement, a coronary artery bypass, or surgery on an artery in your head, neck, heart, or legs? No   3. Do you have chest pain with activity? No   4. Do you have a history of  heart failure? No    5. Do you currently have a cold, bronchitis or symptoms of other infection? No   6. Do you have a cough, shortness of breath, or wheezing? No   7. Do you or anyone in your family have previous history of blood clots? No   8. Do you or does anyone in your family have a serious bleeding problem such as prolonged bleeding following surgeries or cuts? No   9. Have you ever had problems with anemia or been told to take iron pills? YES - during her first pregancy   10. Have you had any abnormal blood loss such as black, tarry or bloody stools, or abnormal vaginal bleeding? No   11. Have you ever had a blood transfusion? No   12. Are you willing to have a blood transfusion if it is medically needed before, during, or after your surgery? Yes   13. Have you or any of your relatives ever had problems with anesthesia? No   14. Do you have sleep apnea, excessive snoring or daytime drowsiness? YES - sleep apnea, going to a sleep study   14a. Do you have a CPAP machine? No   15. Do you have any artifical heart valves or other implanted medical devices like a pacemaker, defibrillator, or continuous glucose monitor? No   16. Do you have artificial joints? No   17. Are you allergic to latex? No   18. Is there any chance that you may be pregnant? No       Health Care Directive:  Patient does not have a Health Care Directive or Living Will: Discussed advance care planning with patient; information given to patient to review.    Preoperative Review of :   reviewed - no record of controlled substances prescribed.      Status of Chronic Conditions:  See problem list for active medical problems.  Problems all longstanding and stable, except as noted/documented.  See ROS for pertinent symptoms related to these conditions.      Review of Systems  Constitutional, neuro, ENT, endocrine, pulmonary, cardiac, gastrointestinal, genitourinary, musculoskeletal, integument and psychiatric systems are negative, except as otherwise  noted.    Patient Active Problem List    Diagnosis Date Noted     Retained intrauterine contraceptive device (IUD) 2021     Priority: Medium     Added automatically from request for surgery [id number]       Chlamydia infection 02/10/2021     Priority: Medium     Polysubstance abuse (H) 02/10/2021     Priority: Medium     Other abnormal cytological finding of specimen from cervix 02/10/2021     Priority: Medium     History of HPV infection 02/10/2021     Priority: Medium      Past Medical History:   Diagnosis Date     Bipolar disorder (H)      Depressive disorder      Herpes 2000     Past Surgical History:   Procedure Laterality Date     sponaneous    ,      Current Outpatient Medications   Medication Sig Dispense Refill     acetaminophen (TYLENOL) 500 MG tablet Take 500-1,000 mg by mouth every 6 hours as needed for mild pain       ARIPiprazole (ABILIFY) 15 MG ODT Take 15 mg by mouth daily       atomoxetine (STRATTERA) 80 MG capsule Take 80 mg by mouth daily       escitalopram (LEXAPRO) 20 MG tablet Take 20 mg by mouth daily       ibuprofen (ADVIL/MOTRIN) 200 MG tablet Take 200 mg by mouth every 4 hours as needed for mild pain       valACYclovir (VALTREX) 500 MG tablet Take 500 mg by mouth 1 Tablet Daily         Allergies   Allergen Reactions     Vistaril [Hydroxyzine]         Social History     Tobacco Use     Smoking status: Former Smoker     Packs/day: 1.00     Years: 20.00     Pack years: 20.00     Types: Cigarettes     Start date: 3/14/1996     Quit date: 2020     Years since quittin.2     Smokeless tobacco: Never Used   Substance Use Topics     Alcohol use: Not Currently     Comment: sober since 2020     Family History   Problem Relation Age of Onset     Cervical Cancer Mother      Chronic Obstructive Pulmonary Disease Mother      Sleep Apnea Mother      Dementia Maternal Grandmother      Sleep Apnea Maternal Grandmother      Lung Cancer Maternal Grandfather      Dementia  "Paternal Grandmother      Dementia Paternal Grandfather      History   Drug Use Unknown     Comment: sober since 12/2020         Objective     /86   Pulse 112   Temp 98.6  F (37  C) (Oral)   Resp 16   Ht 1.727 m (5' 8\")   Wt 123.1 kg (271 lb 6.4 oz)   LMP 03/26/2021 (Approximate)   SpO2 96%   Breastfeeding No   BMI 41.27 kg/m      Physical Exam    GENERAL APPEARANCE: healthy, alert and no distress     EYES: EOMI, PERRL     HENT: mouth without ulcers or lesions     NECK: no adenopathy, no asymmetry, masses, or scars and thyroid normal to palpation     RESP: lungs clear to auscultation - no rales, rhonchi or wheezes     CV: regular rates and rhythm, normal S1 S2, no S3 or S4 and no murmur, click or rub     ABDOMEN:  soft, nontender     MS: extremities normal- no gross deformities noted, no evidence of inflammation in joints, FROM in all extremities.     SKIN: no suspicious lesions or rashes     NEURO: Normal strength and tone, sensory exam grossly normal, mentation intact and speech normal     PSYCH: mentation appears normal. and affect normal/bright     LYMPHATICS: No cervical adenopathy      Diagnostics:  Labs pending at this time.  Results will be reviewed when available.   No EKG required for low risk surgery (cataract, skin procedure, breast biopsy, etc).    Revised Cardiac Risk Index (RCRI):  The patient has the following serious cardiovascular risks for perioperative complications:   - No serious cardiac risks = 0 points     RCRI Interpretation: 0 points: Class I (very low risk - 0.4% complication rate)           Signed Electronically by: Debra Beatty MD  Copy of this evaluation report is provided to requesting physician.      "

## 2021-04-07 NOTE — PATIENT INSTRUCTIONS

## 2021-04-08 ENCOUNTER — HOSPITAL ENCOUNTER (INPATIENT)
Facility: CLINIC | Age: 41
LOS: 4 days | Discharge: HOME | DRG: 885 | End: 2021-04-12
Attending: EMERGENCY MEDICINE | Admitting: PSYCHIATRY & NEUROLOGY
Payer: COMMERCIAL

## 2021-04-08 ENCOUNTER — TELEPHONE (OUTPATIENT)
Dept: BEHAVIORAL HEALTH | Facility: CLINIC | Age: 41
End: 2021-04-08

## 2021-04-08 DIAGNOSIS — F90.9 ATTENTION DEFICIT HYPERACTIVITY DISORDER (ADHD), UNSPECIFIED ADHD TYPE: Primary | ICD-10-CM

## 2021-04-08 DIAGNOSIS — F41.9 ANXIETY: ICD-10-CM

## 2021-04-08 DIAGNOSIS — F31.9 BIPOLAR DISORDER WITH PSYCHOTIC FEATURES (H): ICD-10-CM

## 2021-04-08 DIAGNOSIS — Z20.822 COVID-19 RULED OUT BY LABORATORY TESTING: ICD-10-CM

## 2021-04-08 LAB
AMPHETAMINES UR QL SCN: NEGATIVE
BARBITURATES UR QL: NEGATIVE
BENZODIAZ UR QL: NEGATIVE
CANNABINOIDS UR QL SCN: NEGATIVE
COCAINE UR QL: NEGATIVE
ETHANOL UR QL SCN: NEGATIVE
FLUAV RNA RESP QL NAA+PROBE: NEGATIVE
FLUBV RNA RESP QL NAA+PROBE: NEGATIVE
HCG UR QL: NEGATIVE
HCG UR QL: NEGATIVE
INTERNAL QC OK POCT: YES
LABORATORY COMMENT REPORT: NORMAL
OPIATES UR QL SCN: NEGATIVE
RSV RNA SPEC QL NAA+PROBE: NORMAL
SARS-COV-2 RNA RESP QL NAA+PROBE: NEGATIVE
SPECIMEN SOURCE: NORMAL

## 2021-04-08 PROCEDURE — 250N000013 HC RX MED GY IP 250 OP 250 PS 637: Performed by: STUDENT IN AN ORGANIZED HEALTH CARE EDUCATION/TRAINING PROGRAM

## 2021-04-08 PROCEDURE — C9803 HOPD COVID-19 SPEC COLLECT: HCPCS | Performed by: EMERGENCY MEDICINE

## 2021-04-08 PROCEDURE — 80320 DRUG SCREEN QUANTALCOHOLS: CPT | Performed by: EMERGENCY MEDICINE

## 2021-04-08 PROCEDURE — 80307 DRUG TEST PRSMV CHEM ANLYZR: CPT | Performed by: EMERGENCY MEDICINE

## 2021-04-08 PROCEDURE — 99285 EMERGENCY DEPT VISIT HI MDM: CPT | Performed by: EMERGENCY MEDICINE

## 2021-04-08 PROCEDURE — 124N000002 HC R&B MH UMMC

## 2021-04-08 PROCEDURE — 81025 URINE PREGNANCY TEST: CPT | Performed by: EMERGENCY MEDICINE

## 2021-04-08 PROCEDURE — 87636 SARSCOV2 & INF A&B AMP PRB: CPT | Performed by: EMERGENCY MEDICINE

## 2021-04-08 PROCEDURE — 90791 PSYCH DIAGNOSTIC EVALUATION: CPT

## 2021-04-08 PROCEDURE — 99285 EMERGENCY DEPT VISIT HI MDM: CPT | Mod: 25 | Performed by: EMERGENCY MEDICINE

## 2021-04-08 RX ORDER — LANOLIN ALCOHOL/MO/W.PET/CERES
3 CREAM (GRAM) TOPICAL
Qty: 14 TABLET | Refills: 0 | Status: SHIPPED | OUTPATIENT
Start: 2021-04-08 | End: 2021-04-11

## 2021-04-08 RX ORDER — OLANZAPINE 10 MG/1
10 TABLET ORAL 3 TIMES DAILY PRN
Status: DISCONTINUED | OUTPATIENT
Start: 2021-04-08 | End: 2021-04-12 | Stop reason: HOSPADM

## 2021-04-08 RX ORDER — LANOLIN ALCOHOL/MO/W.PET/CERES
3 CREAM (GRAM) TOPICAL AT BEDTIME
Status: DISCONTINUED | OUTPATIENT
Start: 2021-04-08 | End: 2021-04-12 | Stop reason: HOSPADM

## 2021-04-08 RX ORDER — OLANZAPINE 10 MG/2ML
10 INJECTION, POWDER, FOR SOLUTION INTRAMUSCULAR 3 TIMES DAILY PRN
Status: DISCONTINUED | OUTPATIENT
Start: 2021-04-08 | End: 2021-04-12 | Stop reason: HOSPADM

## 2021-04-08 RX ORDER — VALACYCLOVIR HYDROCHLORIDE 500 MG/1
500 TABLET, FILM COATED ORAL DAILY
Status: DISCONTINUED | OUTPATIENT
Start: 2021-04-08 | End: 2021-04-12 | Stop reason: HOSPADM

## 2021-04-08 RX ORDER — ACETAMINOPHEN 500 MG
500-1000 TABLET ORAL EVERY 6 HOURS PRN
Status: DISCONTINUED | OUTPATIENT
Start: 2021-04-08 | End: 2021-04-12 | Stop reason: HOSPADM

## 2021-04-08 RX ORDER — ATOMOXETINE 40 MG/1
80 CAPSULE ORAL DAILY
Status: DISCONTINUED | OUTPATIENT
Start: 2021-04-09 | End: 2021-04-09

## 2021-04-08 RX ORDER — ARIPIPRAZOLE 10 MG/1
10 TABLET, ORALLY DISINTEGRATING ORAL DAILY
Status: DISCONTINUED | OUTPATIENT
Start: 2021-04-08 | End: 2021-04-09

## 2021-04-08 RX ORDER — TRAZODONE HYDROCHLORIDE 50 MG/1
50 TABLET, FILM COATED ORAL
Status: DISCONTINUED | OUTPATIENT
Start: 2021-04-08 | End: 2021-04-12 | Stop reason: HOSPADM

## 2021-04-08 RX ORDER — IBUPROFEN 200 MG
200 TABLET ORAL EVERY 4 HOURS PRN
Status: DISCONTINUED | OUTPATIENT
Start: 2021-04-08 | End: 2021-04-12 | Stop reason: HOSPADM

## 2021-04-08 RX ORDER — GABAPENTIN 100 MG/1
100 CAPSULE ORAL EVERY 6 HOURS PRN
Status: DISCONTINUED | OUTPATIENT
Start: 2021-04-08 | End: 2021-04-12 | Stop reason: HOSPADM

## 2021-04-08 RX ORDER — ESCITALOPRAM OXALATE 5 MG/1
20 TABLET ORAL DAILY
Status: DISCONTINUED | OUTPATIENT
Start: 2021-04-08 | End: 2021-04-12 | Stop reason: HOSPADM

## 2021-04-08 RX ADMIN — OLANZAPINE 10 MG: 10 TABLET, FILM COATED ORAL at 23:56

## 2021-04-08 RX ADMIN — MELATONIN TAB 3 MG 3 MG: 3 TAB at 22:34

## 2021-04-08 RX ADMIN — ESCITALOPRAM 20 MG: 5 TABLET, FILM COATED ORAL at 22:32

## 2021-04-08 RX ADMIN — ARIPIPRAZOLE 10 MG: 10 TABLET, ORALLY DISINTEGRATING ORAL at 22:32

## 2021-04-08 RX ADMIN — VALACYCLOVIR HYDROCHLORIDE 500 MG: 500 TABLET, FILM COATED ORAL at 22:32

## 2021-04-08 RX ADMIN — TRAZODONE HYDROCHLORIDE 50 MG: 50 TABLET ORAL at 23:56

## 2021-04-08 ASSESSMENT — ACTIVITIES OF DAILY LIVING (ADL)
HYGIENE/GROOMING: INDEPENDENT
ORAL_HYGIENE: INDEPENDENT
LAUNDRY: UNABLE TO COMPLETE
DRESS: INDEPENDENT;SCRUBS (BEHAVIORAL HEALTH)

## 2021-04-08 ASSESSMENT — ENCOUNTER SYMPTOMS
HEADACHES: 0
VOMITING: 0
EYES NEGATIVE: 1
NAUSEA: 0
FLANK PAIN: 0
NERVOUS/ANXIOUS: 0
SLEEP DISTURBANCE: 1
MUSCULOSKELETAL NEGATIVE: 1
CONFUSION: 1
FEVER: 0
DECREASED CONCENTRATION: 1
NECK PAIN: 0
ABDOMINAL PAIN: 0
SHORTNESS OF BREATH: 0

## 2021-04-08 NOTE — TELEPHONE ENCOUNTER
"S: LAWRENCE Feliz , calling with clinical for possible IP MH admission.     B: 42 YO female BIB staff from [facility]. Staff observed her to be disorganized and psychotic last night; had walked into elevator holding fire extinguisher, found later staring out window at night with sunglasses on, yelling about clients and how they were being treated and how they needed to be protected, having conversations with herself, responding to internal stimuli. Pt heard yelling, \"It's time to slay the dragon!\" Pt reported to ED staff she was here for increased anxiety relating to be estranged from her family and requested booster shot for hepatitis. Pt was initially struggling to answer questions during assessment - would stare at staff for up to a minute before answering a yes / no questions. Later in interview she was able to formulate coherent sentences and requested a med adjustment even though she has an appointment tomorrow. Pt was initially discharged from the ED and then was found wandering around [place] outside of ED in the rain picking up trash. Pt was brought back into the ED. UDS negative. Pt is disorganized, slightly paranoid, unable to communicate her needs to staff. [facility] doesn't feel they can meet her needs given her bizarre bx; she's been active in their program for 5 months. Med compliance with all medications per staff including Abilify; pt hasn't tested positive in her time at [facility]. Pt reported two previous hospitalizations in [place] in the past and 2 prior CD treatment episodes in 2015 and 2020. HX of DUI; her children are in the custody of a friend. Chronic medical: hysteroscopy on 5/5/21 due to \"broken IUD that needs to be removed.\" Per program, they tested for Hep A / B / C and she was negative for all. No hx of assault or aggression known. Pt is cooperative and passive in the ED.Pt was doing well in the program; staff haven't seen any of this behavior until yesterday and she's " been there since December.  Medically cleared. Pt reports being prescribed and compliant with: 15 MG Abilify, 20 MG Lexapro, 80 MG Straterra per pt report.     HCG negative  UDS negative  COVID swab needs to be ordered; ED alerted at 1452  Update: ordered      A: Emergency Admission hold will be started    R: Pt added to work list for adult IP MH; pt will await placement in the ED.   1500: Paged MD        Patient cleared and ready for behavioral bed placement: Yes

## 2021-04-08 NOTE — ED NOTES
ED to Behavioral Floor Handoff    SITUATION  Anjali Coker is a 41 year old female who speaks English and lives in a group home with others The patient arrived in the ED by ambulance from [facility] with a complaint of Anxiety (Pt has been having increasing anxiety for a couple days, pt states this is due to not seeing her family)  .The patient's current symptoms started/worsened 2 day(s) ago and during this time the symptoms have increased.   In the ED, pt was diagnosed with   Final diagnoses:   Bipolar disorder with psychotic features (H)   Anxiety        Initial vitals were: BP: 135/87  Pulse: 100  Temp: 97.4  F (36.3  C)  Resp: 16  SpO2: 98 %   --------  Is the patient diabetic? No   If yes, last blood glucose? --     If yes, was this treated in the ED? --  --------  Is the patient inebriated (ETOH) No or Impaired on other substances? No  MSSA done? N/A  Last MSSA score: --    Were withdrawal symptoms treated? N/A  Does the patient have a seizure history? No. If yes, date of most recent seizure--  --------  Is the patient patient experiencing suicidal ideation? denies current or recent suicidal ideation     Homicidal ideation? denies current or recent homicidal ideation or behaviors.    Self-injurious behavior/urges? denies current or recent self injurious behavior or ideation.  ------  Was pt aggressive in the ED No  Was a code called No  Is the pt now cooperative? Yes  -------  Meds given in ED: Medications - No data to display   Family present during ED course? No  Family currently present? No    BACKGROUND  Does the patient have a cognitive impairment or developmental disability? No  Allergies:   Allergies   Allergen Reactions     Vistaril [Hydroxyzine]    .   Social demographics are   Social History     Socioeconomic History     Marital status: Single     Spouse name: None     Number of children: None     Years of education: None     Highest education level: None   Occupational History     None    Social Needs     Financial resource strain: None     Food insecurity     Worry: None     Inability: None     Transportation needs     Medical: None     Non-medical: None   Tobacco Use     Smoking status: Former Smoker     Packs/day: 1.00     Years: 20.00     Pack years: 20.00     Types: Cigarettes     Start date: 3/14/1996     Quit date: 2020     Years since quittin.2     Smokeless tobacco: Never Used   Substance and Sexual Activity     Alcohol use: Not Currently     Comment: sober since 2020     Drug use: Not Currently     Types: Marijuana, Methamphetamines     Comment: sober since 2020     Sexual activity: Not Currently     Partners: Male     Birth control/protection: I.U.D.   Lifestyle     Physical activity     Days per week: None     Minutes per session: None     Stress: None   Relationships     Social connections     Talks on phone: None     Gets together: None     Attends Zoroastrianism service: None     Active member of club or organization: None     Attends meetings of clubs or organizations: None     Relationship status: None     Intimate partner violence     Fear of current or ex partner: None     Emotionally abused: None     Physically abused: None     Forced sexual activity: None   Other Topics Concern     None   Social History Narrative     None        ASSESSMENT  Labs results   Labs Ordered and Resulted from Time of ED Arrival Up to the Time of Departure from the ED   DRUG ABUSE SCREEN 6 CHEM DEP URINE ([facility])   HCG QUALITATIVE URINE   INFLUENZA A/B & SARS-COV2 PCR MULTIPLEX   DOCUMENT   HCG QUAL URINE POCT      Imaging Studies: No results found for this or any previous visit (from the past 24 hour(s)).   Most recent vital signs /87   Pulse 100   Temp 97.4  F (36.3  C) (Oral)   Resp 16   LMP 2021 (Approximate)   SpO2 98%    Abnormal labs/tests/findings requiring intervention:---   Pain control: pt had none  Nausea control: pt had none    RECOMMENDATION  Are any infection  precautions needed (MRSA, VRE, etc.)? No If yes, what infection? --  ---  Does the patient have mobility issues? independently. If yes, what device does the pt use? ---  ---  Is patient on 72 hour hold or commitment? Yes If on 72 hour hold, have hold and rights been given to patient? Yes  Are admitting orders written if after 10 p.m. ?N/A  Tasks needing to be completed:---     Vale Gracia, RN   [facility]--    [phone] [facility]   [phone] [facility]

## 2021-04-08 NOTE — ED PROVIDER NOTES
"    [facility] EMERGENCY DEPARTMENT ([facility])     2021  History     Chief Complaint   Patient presents with     Anxiety     Pt has been having increasing anxiety for a couple days, pt states this is due to not seeing her family     HPI  Anjali Coker is a 41 year old female with a PMH of severe bipolar 1, psychosis, MDD, thyromegaly and polysubstance abuse who presents to the ED today complaining of anxiety and seeking mental health evaluation.  Patient states she has been confused and has not been sleeping. She states she was stuck in an elevator \"quite some time ago\".  Patient denies being anxious, stating she just feels confused.  She states she was hoping for healing here in the ED.  Patient then states she was coming back for vaccine follow-up when she came here.  She states she got her vaccine shot yesterday, and states she got another today.  When again asked why she came to the ED today she states it was because she was \"reading, filling face with food, and hanging out with friends\".  Patient denies suicidal ideation.    History is difficult to obtain as the patient is mostly rocking back and forth on the rocking chair.  It takes her 15 seconds or more to answer question and then she does so monosyllabicall.        I have reviewed the Medications, Allergies, Past Medical and Surgical History, and Social History in the [facility] system.  PAST MEDICAL HISTORY:   Past Medical History:   Diagnosis Date     Bipolar disorder (H)      Depressive disorder      Herpes        PAST SURGICAL HISTORY:   Past Surgical History:   Procedure Laterality Date     sponaneous    ,        Past medical history, past surgical history, medications, and allergies were reviewed with the patient. Additional pertinent items: None    FAMILY HISTORY:   Family History   Problem Relation Age of Onset     Cervical Cancer Mother      Chronic Obstructive Pulmonary Disease Mother      Sleep Apnea Mother      " Dementia Maternal Grandmother      Sleep Apnea Maternal Grandmother      Lung Cancer Maternal Grandfather      Dementia Paternal Grandmother      Dementia Paternal Grandfather        SOCIAL HISTORY:   Social History     Tobacco Use     Smoking status: Former Smoker     Packs/day: 1.00     Years: 20.00     Pack years: 20.00     Types: Cigarettes     Start date: 3/14/1996     Quit date: 2020     Years since quittin.2     Smokeless tobacco: Never Used   Substance Use Topics     Alcohol use: Not Currently     Comment: sober since 2020     Social history was reviewed with the patient. Additional pertinent items: None      Patient's Medications   New Prescriptions    MELATONIN 3 MG TABLET    Take 1 tablet (3 mg) by mouth nightly as needed for sleep   Previous Medications    ACETAMINOPHEN (TYLENOL) 500 MG TABLET    Take 500-1,000 mg by mouth every 6 hours as needed for mild pain    ARIPIPRAZOLE (ABILIFY) 15 MG ODT    Take 10 mg by mouth daily     ATOMOXETINE (STRATTERA) 80 MG CAPSULE    Take 80 mg by mouth daily    ESCITALOPRAM (LEXAPRO) 20 MG TABLET    Take 20 mg by mouth daily    IBUPROFEN (ADVIL/MOTRIN) 200 MG TABLET    Take 200 mg by mouth every 4 hours as needed for mild pain    VALACYCLOVIR (VALTREX) 500 MG TABLET    Take 500 mg by mouth 1 Tablet Daily   Modified Medications    No medications on file   Discontinued Medications    No medications on file          Allergies   Allergen Reactions     Vistaril [Hydroxyzine]         Review of Systems   Constitutional: Negative for fever.   Eyes: Negative.    Respiratory: Negative for shortness of breath.    Cardiovascular: Negative for chest pain.   Gastrointestinal: Negative for abdominal pain, nausea and vomiting.   Genitourinary: Negative for flank pain.   Musculoskeletal: Negative.  Negative for neck pain.   Skin: Negative for rash.   Neurological: Negative for headaches.   Psychiatric/Behavioral: Positive for behavioral problems, confusion, decreased  concentration and sleep disturbance. Negative for suicidal ideas. The patient is not nervous/anxious.    All other systems reviewed and are negative.        Physical Exam     /87   Pulse 100   Temp 97.4  F (36.3  C) (Oral)   Resp 16   LMP 03/26/2021 (Approximate)   SpO2 98%   /87   Pulse 100   Temp 97.4  F (36.3  C) (Oral)   Resp 16   LMP 03/26/2021 (Approximate)   SpO2 98%    Physical Exam  Physical Exam   Constitutional:   well nourished, well developed, resting comfortably patient currently in a rocking chair in room 16 B and rocking back and forth, staring into space  HENT:   Head: Normocephalic and atraumatic.   Cardiovascular: regular rate and rhythm without murmurs or gallops  Pulmonary/Chest: Clear to auscultation bilaterally, with no wheezes or retractions. No respiratory distress.  GI: Soft with good bowel sounds.  Obese Non-tender, non-distended, with no guarding, no rebound, no peritoneal signs.   Back:  No bony or CVA tenderness   Musculoskeletal:  no edema  Skin: Skin is warm and dry. No rash noted.   Neurological: alert and oriented to person, place, and time. Nonfocal exam, GOINS  Psychiatric: His speech is slow and hesitant. Judgment and thought content confused Her mood appears flat .it takes your seconds to minutes to answer a question and her answer is not always appropriate.  She is not agitated, not aggressive, not hyperactive, not actively hallucinating and not combative. Thought content is not paranoid and not delusional. She expresses no homicidal and no suicidal ideation.   ED Course   9:36 AM  The patient was seen and examined by Joy Jennings MD in Room ED16B.        Procedures                           Results for orders placed or performed during the hospital encounter of 04/08/21 (from the past 24 hour(s))   Drug abuse screen 6 urine (tox)   Result Value Ref Range    Amphetamine Qual Urine Negative NEG^Negative    Barbiturates Qual Urine Negative NEG^Negative     Benzodiazepine Qual Urine Negative NEG^Negative    Cannabinoids Qual Urine Negative NEG^Negative    Cocaine Qual Urine Negative NEG^Negative    Ethanol Qual Urine Negative NEG^Negative    Opiates Qualitative Urine Negative NEG^Negative   HCG qualitative urine   Result Value Ref Range    HCG Qual Urine Negative NEG^Negative     Medications - No data to display          Assessments & Plan (with Medical Decision Making)         I have reviewed the nursing notes.  Emergency Department course:  The patient was seen and examined at 0959 am.   The patient was seen by [facility]  Johnnie.  Please see his documentation for details  Per Johnnie, patient is currently living in a sober house and is connected to outpatient long-term chemical dependency treatment through [facility] and [facility].  Patient has a history of substance abuse, primarily methamphetamine and marijuana.  She also has a historical diagnosis of bipolar disorder.  Patient is currently prescribed Abilify 15 mg and Lexapro 20 mg, both of which she takes at night.  Patient also takes Strattera 80 mg which was started in December for seizure risk due to substance abuse.  Patient states she has not slept in 2 days, and attributes this to recent family tension.  She states she recently had an argument with her sister about her children's custody after she completes treatment.  Patient is currently estranged from her family due to her drug and alcohol abuse.  Patient is temporarily lost custody of her children due to her substance abuse.  Her children have been placed with a coworker of hers who planned to keep them until they finish school.  The patient and her children will then moved in with her sister in [facility].  Patient is currently been in treatment since December of last year.  Here in the ED, patient denies suicidal ideation.  Patient has been through treatment 3 times starting in 2015, with this being her second time through her adult and  [facility].  Patient currently has a psychiatrist (Mo Arndt) at the [facility], and has an appointment with him tomorrow.  Patient states she was told she may need a booster for her hepatitis vaccine, and that is why she came in today.  Patient states she would like a medication for sleep.  She states she has tried trazodone and hydroxyzine in the past, but did not like how they made her feel.  Patient also acknowledged heightened anxiety and insomnia.    Patient was recently seen in office visit yesterday on 4/7/2021 for a preop exam for a hysteroscopy with dilation and curettage that she is scheduled for 5/5/2021.    The patient is a 41-year-old female with history of substance abuse, bipolar disorder, and anxiety presents to the ED for unclear reasons.  Apparently, she would like something to help her sleep.  She does not want trazodone or hydroxyzine.  I prescribed melatonin to see if this would help her.  She does have an appointment with her psychiatrist tomorrow.  She will be discharged from the ED with close outpatient follow-up.  I have reviewed the findings, diagnosis, plan and need for follow up with the patient.    Addendum: Apparently, shortly after the patient was discharged, she was found outside picking up trash.  It is raining out and she is quite wet.  Her group home has refused to take her back; apparently, they feel she is too manic to cope.       Per  interview at 11:10am:  Came in due to anxiety and to get hepatitis follow-up vaccination.   Pt was initially slow in responses, however this improved after the first 10 minutes of conversation. When discharged from ED earlier today, staff found her wandering outside the main door in the rain picking up trash. [facility] said they sent her in to the ED here due to bizarre behavior overnight. She was found in the elevator of the residence with a fire extinguisher overnight.  She was returned to her room and later was found in bed  "looking out the window at night with sunglasses on. She was unresponsive to external stimuli. She also had episodes of yelling and complaining, and seemed to be responding to internal stimuli. [facility] states they do not feel safe for her to be discharged back to them. They state they have observed her taking her medications, and she was breathalyzed this morning at 0.0.  [facility] also does random UAs, with the most recent being 2 weeks ago and negative for drug use.  Her UA was also negative today.  Her Patient has made statements like \"It's time to slay the dragon.\"  Per , patient seems psychotic.    After further evaluation, I made the decision to admit the patient on a 72-hour hold.  She is bipolar and clearly manic.  She is occasionally unresponsive to external stimuli.  She has been behaving oddly and her group home is not able to manage her.  I believe the patient would benefit from mental health stabilization.  She is admitted here to [facility] on a 72-hour hold      New Prescriptions    MELATONIN 3 MG TABLET    Take 1 tablet (3 mg) by mouth nightly as needed for sleep       Final diagnoses:   Bipolar disorder with psychotic features (H)   Anxiety   I, Basilio Avalos, am serving as a trained medical scribe to document services personally performed by Joy Jennings MD, based on the provider's statements to me.     IJoy MD, was physically present and have reviewed and verified the accuracy of this note documented by Basilio Avalos.  This note was created in part by the use of Dragon voice recognition dictation system. Inadvertent grammatical errors and typographical errors may still exist.  Joy Jennings MD    4/8/2021   [facility] EMERGENCY DEPARTMENT     Joy Jennings MD  04/08/21 1515    "

## 2021-04-08 NOTE — H&P
"Psychiatry History and Physical    Anjali Coker MRN# [id number]   Age: 41 year old YOB: 1980     Date of Admission:  4/8/2021  Admitting Physician:  Dr. Sheehan          Contacts:     Primary Outpatient Psychiatrist: Shell YUSUF @ [facility]  Primary Physician: Debra Beatty [facility]  Therapist: at [facility] : unknown  Probation/: n/a  Family Members: estranged family; , children  [facility]: [phone]         Chief Complaint:     \"tired, havent slept\"         History of Present Illness:     History obtained from electronic chart    Anjali Coker is a 41 year old  female with a past psychiatric history of Bipolar 1 disorder with psychotic features admitted from the  ER on 04/08/2021 due to concern for psychosis and molina after 2 days of not sleeping in the context of medication adherence and psychosocial stressors including family dynamics.     Per ED Note:   \"Anjali Coker is a 41 year old female with a PMH of severe bipolar 1, psychosis, MDD, thyromegaly and polysubstance abuse who presents to the ED today complaining of anxiety and seeking mental health evaluation.  Patient states she has been confused and has not been sleeping. She states she was stuck in an elevator \"quite some time ago\".  Patient denies being anxious, stating she just feels confused.  She states she was hoping for healing here in the ED.  Patient then states she was coming back for vaccine follow-up when she came here.  She states she got her vaccine shot yesterday, and states she got another today.  When again asked why she came to the ED today she states it was because she was \"reading, filling face with food, and hanging out with friends\".  Patient denies suicidal ideation. History is difficult to obtain as the patient is mostly rocking back and forth on the rocking chair.  It takes her 15 seconds or more to answer question and then " "she does so monosyllabicall.\"  \"The patient was seen by [facility]  Johnnie.  Please see his documentation for details  Per Johnnie, patient is currently living in a sober house and is connected to outpatient long-term chemical dependency treatment through [facility].  Patient has a history of substance abuse, primarily methamphetamine and marijuana.  She also has a historical diagnosis of bipolar disorder.  Patient is currently prescribed Abilify 15 mg and Lexapro 20 mg, both of which she takes at night.  Patient also takes Strattera 80 mg which was started in December for seizure risk due to substance abuse.  Patient states she has not slept in 2 days, and attributes this to recent family tension.  She states she recently had an argument with her sister about her children's custody after she completes treatment.  Patient is currently estranged from her family due to her drug and alcohol abuse.  Patient is temporarily lost custody of her children due to her substance abuse.  Her children have been placed with a coworker of hers who planned to keep them until they finish school.  The patient and her children will then moved in with her sister in [facility].  Patient is currently been in treatment since December of last year.  Here in the ED, patient denies suicidal ideation.  Patient has been through treatment 3 times starting in 2015, with this being her second time [facility].  Patient currently has a psychiatrist (Mo Arndt) at the [facility], and has an appointment with him tomorrow.  Patient states she was told she may need a booster for her hepatitis vaccine, and that is why she came in today.  Patient states she would like a medication for sleep.  She states she has tried trazodone and hydroxyzine in the past, but did not like how they made her feel.  Patient also acknowledged heightened anxiety and insomnia.  Patient was recently seen in office visit " "yesterday on 4/7/2021 for a preop exam for a hysteroscopy with dilation and curettage that she is scheduled for 5/5/2021.  The patient is a 41-year-old female with history of substance abuse, bipolar disorder, and anxiety presents to the ED for unclear reasons.  Apparently, she would like something to help her sleep.  She does not want trazodone or hydroxyzine.  I prescribed melatonin to see if this would help her.  She does have an appointment with her psychiatrist tomorrow.  She will be discharged from the ED with close outpatient follow-up.  Apparently, shortly after the patient was discharged, she was found outside picking up trash.  It is raining out and she is quite wet.  Her group home has refused to take her back; apparently, they feel she is too manic to cope.  Per  interview at 11:10am:  Came in due to anxiety and to get hepatitis follow-up vaccination.   Pt was initially slow in responses, however this improved after the first 10 minutes of conversation. When discharged from ED earlier today, staff found her wandering outside the main door in the rain picking up trash. [facility] said they sent her in to the ED here due to bizarre behavior overnight. She was found in the elevator of the residence with a fire extinguisher overnight.  She was returned to her room and later was found in bed looking out the window at night with sunglasses on. She was unresponsive to external stimuli. She also had episodes of yelling and complaining, and seemed to be responding to internal stimuli. [facility] states they do not feel safe for her to be discharged back to them. They state they have observed her taking her medications, and she was breathalyzed this morning at 0.0.  [facility] also does random UAs, with the most recent being 2 weeks ago and negative for drug use.  Her UA was also negative today.  Her Patient has made statements like \"It's time to slay the dragon.\"  Per , patient " "seems psychotic.  After further evaluation, I made the decision to admit the patient on a 72-hour hold.  She is bipolar and clearly manic.  She is occasionally unresponsive to external stimuli.  She has been behaving oddly and her group home is not able to manage her.  I believe the patient would benefit from mental health stabilization.  She is admitted here to [facility] on a 72-hour hold\"    She was medically cleared for admission to inpatient psychiatric unit.    Per DEC:  Hx of BP1, alcohol and polysubstance abuse. 2 prior admission in [place] and 2 prior CD treatments. Pt currently lives at [facility] since [facility] in [facility] long-term residential program. Her children have been placed with a firned and she is alienated from her family due to her hx of susbtance abuse. Pt saw her pcp ysterday for pre-op visit. She is scheduled for a hyterectomy on 5/5 to remove a damaged IUD. She also complains about needing a booster vaccination for Hep A, which was her primary reason to come to the ED. Negative for A, B, and C in past month.   Pt stated she had not slept the last two nights. Pt was sent here by [facility]. She was found in elevator last night holding a fire extinguisher. She would not respond to staff questions. She was later seen sitting on her bed staring out a window at night wearing her sunglasses.  She would periodically yell about the behavior of other residents saying that she needed to protect them at 1 point stating that it was \"time to slay Bryant.\"  In the morning while being questioned about her behaviors last night she asked staff to give her a pair of scissors so she could groom her private area.  Patient says that she attends all groups in the treatment program and is working on relapse prevention.  She twice referred to treatment as her school program.  Patient denies that she is suicidal and does not want to be admitted she just wants something to help her sleep. "  She expects to complete treatment in a year then moved her sisters in [facility], where she plans to move her children in the summer after they finished school.  She gave custody of her children to a former coworker while she was in treatment.  When asked why she is having more trouble with sleep this week she references an argument she had with a different sister 2 days ago over where she has placed her children currently and in the future.  She says that she is estranged from her family in general.  Writer staffed the patient with the ED physician and plan was for patient to be discharged back to the facility.  Patient told writer she had a phone number to call the treatment program for a ride back but after discharge she was seen wandering outside the doors and pouring rain picking up trash from the property.  When treatment staff called they said the patient is to unstable to return safely to the program.  They indicate behavior of patient yesterday and today is not her baseline.  They have no reason to believe that she is off her medications or has used any illegal substances.  Patient's urine toxicology is negative for all substances.  Given that safe discharge back to treatment program is not possible and due to patient previously stating she is not interested in admission, patient was placed on a 72-hour hold.    Per patient report:    Anjali Coker was interviewed over telemedicine due to COVID.  She was seen lying in bed with her blankets covering her face.  Prior to her interview she was sleeping and woke up by nursing.  Anjali appeared somnolent and had difficulty staying awake when being asked questions.  Of the few questions she was able to answer she did so with short terse answers that were mostly vague.  Her speech was soft volume with paucity and at times mumbled being difficult to understand.  Anjali states that she initially came to the hospital because she had not slept for the past 2  days.  When asked if there was any precipitating factor she could not provide an answer.  Patient stated that she is just really tired and needs sleep.  She was reassured and interviewed be short though with any further questioning she would nod off.  Patient had no acute concerns or complaints other than requesting sleep.  Discussion with nursing staff revealed that patient had difficulty staying awake with intake interview when she got to the unit.    Notably patient was seen in sleep medicine clinic on March 11 for evaluation of excessive daytime sleepiness.  It was determined that she was at an intermediate risk of having obstructive sleep apnea.  At this time Anjali was open to a trial of CPAP and they ordered an in lab polysomnography.     The risks, benefits, alternatives and side effects have been discussed and are understood by the patient and other caregivers.           Medical Review of Systems:     The Review of Systems is negative other than what is noted in the HPI         Psychiatric History:     Prior diagnoses: previous psychiatric diagnoses include bipolar 1 disorder, unspecified anxiety, polysubstance abuse, alcohol abuse.     Hospitalizations: Twice at [place] for psychiatric.     Court Committments:  None per chart review    Suicide attempts: None per chart review     Self-injurious behavior: None per chart review     Guns: no    Violence: None per chart review   Has anyone hurt you physically, for example by pushing, hitting, slapping or kicking you or forcing you to have sex? Denies  Do you feel threatened or controlled by a partner, ex-partner or anyone in your life? Denies    ECT: None per chart review     TMS: None per chart review            Substance Use History:     Patient has history of polysubstance abuse and alcohol abuse.  Was under short-term treatment in October 2020 and started at long-term treatment in December to present, both [facility].  Also had prior CD  "treatment in 2015 at [facility] in [place].     CD: UTOX negative upon admission and per [facility], pt has not tested positive for anything since being there. She has been active in their treatment for 5 months.   Alcohol:              -Pt reports she was never a daily drinker. She reports she would drink \"just a few drinks\" about weekly. She reports her last drink being \"not recently.\" Says it has been more than a week.   Marijuana:              -Pt reports she was a daily smoker before she got sober. She reports the last time being .  Cocaine/crack:              -Pt reports she has only tried this \"like twice.\"  Methamphetamine:              -Pt reports the last time she used meth was .   -Pt reports she has used drugs \"all ways\" including I.V. use.   -Smoker: Pt denies using Nicotine.          Social History:     Pt currently lives at [facility]. When asked, she reports she doesn't have a home and has just transitioned from outpatient treatment to inpatient. However, she reports she has a  and children. Per chart review, it appears pt has had some family conflict recently, but she doesn't provide details to writer. However, she does report her  is supportive of her sobriety and is sober himself. Pt report she was previously working at [employer], but has \"resigned.\" She reports now she received support from the state. When asked if there is anything in life that is satisfying to her she names her  and children.           Past Medical History:     Past Medical History:   Diagnosis Date     Bipolar disorder (H)      Depressive disorder      Herpes 2000     Past Surgical History:   Procedure Laterality Date     sponaneous    ,           Allergies:      Allergies   Allergen Reactions     Vistaril [Hydroxyzine]           Medications:     Current Outpatient Medications   Medication Sig Dispense Refill     acetaminophen (TYLENOL) 500 MG tablet " Take 500-1,000 mg by mouth every 6 hours as needed for mild pain       ARIPiprazole (ABILIFY) 15 MG ODT Take 10 mg by mouth daily        atomoxetine (STRATTERA) 80 MG capsule Take 80 mg by mouth daily       escitalopram (LEXAPRO) 20 MG tablet Take 20 mg by mouth daily       ibuprofen (ADVIL/MOTRIN) 200 MG tablet Take 200 mg by mouth every 4 hours as needed for mild pain       melatonin 3 MG tablet Take 1 tablet (3 mg) by mouth nightly as needed for sleep 14 tablet 0     valACYclovir (VALTREX) 500 MG tablet Take 500 mg by mouth 1 Tablet Daily               Family History:       Family History   Problem Relation Age of Onset     Cervical Cancer Mother      Chronic Obstructive Pulmonary Disease Mother      Sleep Apnea Mother      Dementia Maternal Grandmother      Sleep Apnea Maternal Grandmother      Lung Cancer Maternal Grandfather      Dementia Paternal Grandmother      Dementia Paternal Grandfather             Labs:     Recent Results (from the past 24 hour(s))   Drug abuse screen 6 urine (tox)    Collection Time: 04/08/21  1:24 PM   Result Value Ref Range    Amphetamine Qual Urine Negative NEG^Negative    Barbiturates Qual Urine Negative NEG^Negative    Benzodiazepine Qual Urine Negative NEG^Negative    Cannabinoids Qual Urine Negative NEG^Negative    Cocaine Qual Urine Negative NEG^Negative    Ethanol Qual Urine Negative NEG^Negative    Opiates Qualitative Urine Negative NEG^Negative   HCG qualitative urine    Collection Time: 04/08/21  1:24 PM   Result Value Ref Range    HCG Qual Urine Negative NEG^Negative   hCG qual urine POCT    Collection Time: 04/08/21  2:56 PM   Result Value Ref Range    HCG Qual Urine Negative neg    Internal QC OK Yes    Asymptomatic Influenza A/B & SARS-CoV2 (COVID-19) Virus PCR Multiplex    Collection Time: 04/08/21  3:07 PM    Specimen: Nasopharyngeal   Result Value Ref Range    Flu A/B & SARS-COV-2 PCR Source Nasopharyngeal     SARS-CoV-2 PCR Result NEGATIVE     Influenza A PCR  "Negative NEG^Negative    Influenza B PCR Negative NEG^Negative    Respiratory Syncytial Virus PCR (Note)     Flu A/B & SARS-CoV-2 PCR Comment (Note)           Psychiatric Examination:   /87   Pulse 100   Temp 97.4  F (36.3  C) (Oral)   Resp 16   LMP 03/26/2021 (Approximate)   SpO2 98%     Appearance: Laying in bed with blanket over the face, drowsy, somnolent  Attitude:  somewhat cooperative though had difficulty staying awake  Eye Contact:  poor Due to covering face with blanket and somnolence  Mood:  \" Tired\"  Affect:  mood congruent, intensity is flat, immobile, restricted range and nonreactive  Speech:  mumbling, soft and low volume, short terse responses, poverty and paucity  Psychomotor Behavior:  physical retardation  Thought Process:  logical  Associations:  no loose associations  Thought Content:  no evidence of suicidal ideation or homicidal ideation and no evidence of psychotic thought  Insight:  limited  Judgment:  limited  Oriented to: Was not initially oriented to time per nursing, but oriented to person and place and situation  Attention Span and Concentration:  poor  Recent and Remote Memory:  fair  Language:  english with appropriate syntax and vocabulary  Fund of Knowledge: appropriate  Muscle Strength and Tone: Not observed  Gait and Station: Not observed         Physical Exam:     See ED assessment note by ED physician on 4/8        Assessment   Anjali Coker is a 41 year old  female with a past psychiatric history of Bipolar 1 disorder with psychotic features admitted from the  ER on 04/08/2021 due to concern for psychosis and molina after 2 days of not sleeping in the context of medication adherence and psychosocial stressors including family dynamics. Significant symptoms include depressed, neurovegetative symptoms, sleep issues, psychosis and disorganization.  She is currently living at [facility] for CD treatment and followed by Dr. Goff and Dr. Beatty (psych " and PCP). Current psychosocial stressors include family dynamics.  Patient's support system includes outpatient team.  Substance use does not appear to be playing a contributing role in the patient's presentation. Medical history does not appear to be significant for developmental delay and thyroid disorder. The MSE is notable for excessive drowsiness and somnolence and while awake having a nonreactive and flat affect. She denies self injurious behaviors. Her reported symptoms of depression, insomnia, confusion, and disorganization are consistent with her historic diagnosis of bipolar 1 disorder, mixed episode with psychotic features. Optimization of medications to target these symptom clusters in addition to evaluation of adquate outpatient supports will be targets for treatment during this admission.     It is unclear at this time what precipitated her acute deterioration.  Per reviewing primary care notes, patient has been largely stable on current medication regimen for quite some time.  Given that urinary drug screen was negative and patient has been living in a CD facility, unlikely that substances contributed.  It is possible that psychosocial stressors including a recent argument with a sister about Gret[place] children and their custody situation could have lowered her threshold to experience a manic/mixed episode.  Also per chart review patient has been recently evaluated by sleep medicine and there is a concern that she may have obstructive sleep apnea.  Illnesses that disrupt sleep could also potentially trigger a manic episode.  Patient will be further interview tomorrow when she is not somnolent and collateral will be gathered from other health providers and family.  While precipitating factors may be unknown it is likely that medication management will be done to possibly prevent/raise the threshold for another episode to occur.  In the meantime patient will be restarted on prior to admission  medications until her case can be staffed with attending physician.    Given that she currently has psychosis and molina, patient warrants inpatient psychiatric hospitalization to maintain her safety. Disposition pending clinical stabilization, medication optimization and development of an appropriate discharge plan.    Risk for harm is low.  Risk factors: substance use, trauma and family dynamics  Protective factors: family and engaged in treatment     Principal psychiatric diagnosis:   - Bipolar I disorder, current episode mixed with psychotic features    Secondary psychiatric diagnoses:   -Alcohol abuse uncomplicated  - polysubstance abuse, in remission            Plan     Admit to Unit 20 with Attending Physician Dr. Aguila M.D.    Medications:   Outpatient medications held:     None    Outpatient medications continued:   -Aripiprazole 10 mg daily  -Atomoxetine 80 mg daily  -Escitalopram 20 mg daily  -Melatonin 3 mg nightly  -Valacyclovir 500 mg daily    New medications initiated:   None    Hospital PRNs as ordered:    -Olanzapine 10 mg PO/IM prn Q2H severe agitation/psychosis  -Gabapentin PRN for anxiety (patient is allergic to hydroxyzine)    Medications: risks/benefits discussed with patient    Patient will be treated in therapeutic milieu with appropriate individual and group therapies.    Laboratory/Imaging:  - Labs: CMP, TSH, B12, Vit D, folate, UA  -CBC within normal limits    Legal Status:   Orders Placed This Encounter      Emergency Hospitalization Hold (72 Hr Hold)      Safety Assessment:        Pt has not required locked seclusion or restraints in the past 24 hours to maintain safety, please refer to RN documentation for further details.    Consults:  - none    Medical diagnoses to be addressed this admission:     # none       Dispo: unknown pending medication management and clinical stabilization    Patient to be staffed with the attending physician in the morning.      -------------------------------------------------------  Criselda Sparrow MD  PGY-1 Psychiatry Resident    Psychiatry Attending Attestation:    Attestation:  For attending attestation statement, see progress note dated April 9 , 2021. Theo Sheehan MD

## 2021-04-08 NOTE — ED NOTES
Pt placed back in room 16B to be re evaluated, group home called and will not  pt due to pt's manic episodes,  notified and going to reassess pt.

## 2021-04-08 NOTE — ED NOTES
Pt was observed wandering around the traffic [place] in the rain. Pt brought back into ED hallway to wait for her ride back to .

## 2021-04-08 NOTE — ED NOTES
[facility] ED Mental Health Handoff Note:       Brief HPI:  This is a 41 year old female signed out to me by Dr. Jennings.  See initial ED Provider note for full details of the presentation.     Home meds reviewed and ordered/administered: No    Medically stable for inpatient mental health admission: Yes.    Evaluated by mental health: Yes. The recommendation is for inpatient mental health treatment. Bed search in process    Safety concerns: At the time I received sign out, there were no safety concerns.    Hold Status:  Active Orders   Legal    Emergency Hospitalization Hold (72 Hr Hold)     Frequency: Effective Now     Start Date/Time: 04/08/21 1410      Number of Occurrences: Until Specified            Exam:   Patient Vitals for the past 24 hrs:   BP Temp Temp src Pulse Resp SpO2   04/08/21 1424 135/87 97.4  F (36.3  C) Oral 100 16 98 %           ED Course:    Medications - No data to display         There were no significant events during my shift.          Impression:    ICD-10-CM    1. Bipolar disorder with psychotic features (H)  F31.9 Asymptomatic Influenza A/B & SARS-CoV2 (COVID-19) Virus PCR Multiplex   2. Anxiety  F41.9        Plan:    1. Admitted/transferred to inpatient mental health bed.      RESULTS:   Results for orders placed or performed during the hospital encounter of 04/08/21 (from the past 24 hour(s))   Drug abuse screen 6 urine (tox)     Status: None    Collection Time: 04/08/21  1:24 PM   Result Value Ref Range    Amphetamine Qual Urine Negative NEG^Negative    Barbiturates Qual Urine Negative NEG^Negative    Benzodiazepine Qual Urine Negative NEG^Negative    Cannabinoids Qual Urine Negative NEG^Negative    Cocaine Qual Urine Negative NEG^Negative    Ethanol Qual Urine Negative NEG^Negative    Opiates Qualitative Urine Negative NEG^Negative   HCG qualitative urine     Status: None    Collection Time: 04/08/21  1:24 PM   Result Value Ref Range    HCG Qual Urine Negative NEG^Negative    hCG qual urine POCT     Status: None    Collection Time: 04/08/21  2:56 PM   Result Value Ref Range    HCG Qual Urine Negative neg    Internal QC OK Yes    Asymptomatic Influenza A/B & SARS-CoV2 (COVID-19) Virus PCR Multiplex     Status: None    Collection Time: 04/08/21  3:07 PM    Specimen: Nasopharyngeal   Result Value Ref Range    Flu A/B & SARS-COV-2 PCR Source Nasopharyngeal     SARS-CoV-2 PCR Result NEGATIVE     Influenza A PCR Negative NEG^Negative    Influenza B PCR Negative NEG^Negative    Respiratory Syncytial Virus PCR (Note)     Flu A/B & SARS-CoV-2 PCR Comment (Note)              David Anthony, David Baig,   04/10/21 0315

## 2021-04-08 NOTE — ED TRIAGE NOTES
Pt comes from group home she states is called [facility] in [place].  Pt states she has been having increasing anxiety for a couple days, pt states this is due to not seeing her family. Pt appears anxious during triage questions, fidgeting with hands, rocking back and forth. Very slow to answer questions and will not make eye contact.

## 2021-04-08 NOTE — SAFE
Anjali Coker  April 8, 2021    Patient sent to ED by [facility] to address significant disorganization and apparent psychosis last night, atypical for this patient, who has been in their care for 5 months, observed to be med compliant and sober. Denies suicidal intention or plan. Patient presented as self-referral, wanting medication to help her sleep, has not slept in 2 days. Patient wandering in facility last night, responding to internal stimulai and yelling about behavior of other residents, saying she needed to protect them. Discharged back to facility, but found wandering outside ED in rain, picking up trash, and escorted back. Facility does not believe she is stable to return to their program at this time, but feel she was doing well until a day or so ago.      Current Suicidal Ideation/Self-Injurious Concerns/Methods: None - N/A    Inappropriate Sexual Behavior: No    Aggression/Homicidal Ideation: None - N/A      For additional details see full DEC assessment.       Johnnie Carter

## 2021-04-09 LAB
ALBUMIN SERPL-MCNC: 3.7 G/DL (ref 3.4–5)
ALP SERPL-CCNC: 66 U/L (ref 40–150)
ALT SERPL W P-5'-P-CCNC: 36 U/L (ref 0–50)
ANION GAP SERPL CALCULATED.3IONS-SCNC: 8 MMOL/L (ref 3–14)
AST SERPL W P-5'-P-CCNC: 14 U/L (ref 0–45)
BILIRUB SERPL-MCNC: 0.5 MG/DL (ref 0.2–1.3)
BUN SERPL-MCNC: 14 MG/DL (ref 7–30)
CALCIUM SERPL-MCNC: 8.6 MG/DL (ref 8.5–10.1)
CHLORIDE SERPL-SCNC: 106 MMOL/L (ref 94–109)
CO2 SERPL-SCNC: 24 MMOL/L (ref 20–32)
CREAT SERPL-MCNC: 0.66 MG/DL (ref 0.52–1.04)
FOLATE SERPL-MCNC: 52.6 NG/ML
GFR SERPL CREATININE-BSD FRML MDRD: >90 ML/MIN/{1.73_M2}
GLUCOSE SERPL-MCNC: 104 MG/DL (ref 70–99)
POTASSIUM SERPL-SCNC: 3.9 MMOL/L (ref 3.4–5.3)
PROT SERPL-MCNC: 7.3 G/DL (ref 6.8–8.8)
SODIUM SERPL-SCNC: 138 MMOL/L (ref 133–144)
TSH SERPL DL<=0.005 MIU/L-ACNC: 1.01 MU/L (ref 0.4–4)
VIT B12 SERPL-MCNC: 506 PG/ML (ref 193–986)

## 2021-04-09 PROCEDURE — 250N000013 HC RX MED GY IP 250 OP 250 PS 637: Performed by: STUDENT IN AN ORGANIZED HEALTH CARE EDUCATION/TRAINING PROGRAM

## 2021-04-09 PROCEDURE — 124N000002 HC R&B MH UMMC

## 2021-04-09 PROCEDURE — 82607 VITAMIN B-12: CPT | Performed by: PSYCHIATRY & NEUROLOGY

## 2021-04-09 PROCEDURE — 80053 COMPREHEN METABOLIC PANEL: CPT | Performed by: PSYCHIATRY & NEUROLOGY

## 2021-04-09 PROCEDURE — 36415 COLL VENOUS BLD VENIPUNCTURE: CPT | Performed by: PSYCHIATRY & NEUROLOGY

## 2021-04-09 PROCEDURE — 82746 ASSAY OF FOLIC ACID SERUM: CPT | Performed by: PSYCHIATRY & NEUROLOGY

## 2021-04-09 PROCEDURE — 99223 1ST HOSP IP/OBS HIGH 75: CPT | Mod: AI | Performed by: PSYCHIATRY & NEUROLOGY

## 2021-04-09 PROCEDURE — G0177 OPPS/PHP; TRAIN & EDUC SERV: HCPCS

## 2021-04-09 PROCEDURE — 84443 ASSAY THYROID STIM HORMONE: CPT | Performed by: PSYCHIATRY & NEUROLOGY

## 2021-04-09 RX ORDER — VALACYCLOVIR HYDROCHLORIDE 500 MG/1
500 TABLET, FILM COATED ORAL AT BEDTIME
Status: CANCELLED | OUTPATIENT
Start: 2021-04-09

## 2021-04-09 RX ORDER — ARIPIPRAZOLE 10 MG/1
10 TABLET, ORALLY DISINTEGRATING ORAL AT BEDTIME
Status: CANCELLED | OUTPATIENT
Start: 2021-04-09

## 2021-04-09 RX ORDER — ESCITALOPRAM OXALATE 5 MG/1
20 TABLET ORAL AT BEDTIME
Status: CANCELLED | OUTPATIENT
Start: 2021-04-09

## 2021-04-09 RX ORDER — OLANZAPINE 10 MG/1
10 TABLET ORAL AT BEDTIME
Status: DISCONTINUED | OUTPATIENT
Start: 2021-04-09 | End: 2021-04-12 | Stop reason: HOSPADM

## 2021-04-09 RX ORDER — ATOMOXETINE 40 MG/1
40 CAPSULE ORAL DAILY
Status: DISCONTINUED | OUTPATIENT
Start: 2021-04-10 | End: 2021-04-12 | Stop reason: HOSPADM

## 2021-04-09 RX ADMIN — MELATONIN TAB 3 MG 3 MG: 3 TAB at 21:49

## 2021-04-09 RX ADMIN — ESCITALOPRAM 20 MG: 5 TABLET, FILM COATED ORAL at 08:30

## 2021-04-09 RX ADMIN — OLANZAPINE 10 MG: 10 TABLET, FILM COATED ORAL at 21:49

## 2021-04-09 RX ADMIN — VALACYCLOVIR HYDROCHLORIDE 500 MG: 500 TABLET, FILM COATED ORAL at 08:30

## 2021-04-09 RX ADMIN — ATOMOXETINE 80 MG: 40 CAPSULE ORAL at 08:30

## 2021-04-09 ASSESSMENT — ACTIVITIES OF DAILY LIVING (ADL)
DRESS: STREET CLOTHES;INDEPENDENT
HYGIENE/GROOMING: INDEPENDENT
ORAL_HYGIENE: INDEPENDENT

## 2021-04-09 NOTE — PROGRESS NOTES
"During rounds at 2330, pt was sitting up on her bed with her bedding pulled up around her. She asked for her sister. She was reoriented to reality of being in the hospital and then she asked if her family knows she is here. Writer reminded her she told writer she planned to call her  herself earlier this evening. She reported she had an accident and her bedding was soaked. She was worried about contaminating the floor with her socks. She was helped into the shower and her bedding was changed. She poke her head out of the shower several times, once saying, \"There are people eating on the ground over there.\" Pointing to the empty hallway. As these increased psychotic symptoms were noted and pt reported she was most concerned with her sleep, she agreed to take PRN 10 mg Zyprexa and PRN Trazodone at about midnight.   "

## 2021-04-09 NOTE — PROGRESS NOTES
Behavioral Health  Note   Behavioral Health  Spirituality Group Note     Unit 22    Name: Anjali Coker    YOB: 1980   MRN: [id number]    Age: 41 year old     Patient attended -led group, which included discussion of spirituality, coping with illness and building resilience.   Patient attended group for 1.0 hrs.   The patient actively participated in group discussion     [facility]  Staff    Page [phone]

## 2021-04-09 NOTE — PLAN OF CARE
BEHAVIORAL TEAM DISCUSSION    Participants:   Theo Sheehan MD Attending Psychiatrist  Criselda Sparrow MD, PGY1  Albania Henry, MSW, [facility] Clinical Treatment Coordinator  Beverly Gracia, ASIA [place] MS3  Kalen Zuñiga MS3  [facility] Resident  Progress: initial team meeting  Anticipated length of stay: assessment ongoing - likely 3 - 7 days  Continued Stay Criteria/Rationale: patient is admitted for assessment/treatment of symptoms concerning for molina / psychosis.   Medical/Physical: Per psychiatry physician H&P  Medical diagnoses to be addressed this admission:      # none   Precautions:   Behavioral Orders   Procedures    Code 1 - Restrict to Unit    Routine Programming     As clinically indicated    Single Room    Status 15     Every 15 minutes.     Plan: Psychiatric assessment. Medication management. Therapeutic Milieu. Individual care planning and after care planning. Patient to participate in unit groups and activities. Individual and group support on unit.   Rationale for change in precautions or plan: per initial assessment.

## 2021-04-09 NOTE — PROVIDER NOTIFICATION
04/08/21 1900   Patient Belongings   Did you bring any home meds/supplements to the hospital?  No   Patient Belongings locker   Patient Belongings Put in Hospital Secure Location (Security or Locker, etc.) bracelet;jewelry;necklace;ring;shoes  (Simultaneous filing. User may not have seen previous data.)   Belongings Search Yes   Clothing Search Yes   Second Staff miguelriparam     Radio  Pen  Lip balm  Written prescription for melatonin  Silver colored cross necklace with 3 wedding rings on it  Silver colored heart necklace  Silver cross beaded bracelet  birkenstock sandals  jacket     A               Admission:  I am responsible for any personal items that are not sent to the safe or pharmacy.  [facility] is not responsible for loss, theft or damage of any property in my possession.    Signature:  _________________________________ Date: _______  Time: _____                                              Staff Signature:  ____________________________ Date: ________  Time: _____      2nd Staff person, if patient is unable/unwilling to sign:    Signature: ________________________________ Date: ________  Time: _____     Discharge:  [facility] has returned all of my personal belongings:    Signature: _________________________________ Date: ________  Time: _____                                          Staff Signature:  ____________________________ Date: ________  Time: _____

## 2021-04-09 NOTE — PLAN OF CARE
Initial Psychosocial Assessment    I have reviewed the chart, met with the patient, and developed Care Plan.  Information for assessment was obtained from:     Patient: team interview and individual meeting and Chart: review of admission and historical encounters    Presenting Problem:  patient is admitted for assessment of recent changes in behavior/ thinking - has been living at a residential HEATH program - [facility]. patient has a history of diagnosis of bipolar disorder and polysubstance use. See H&P and ED notes for further details of initial presentation.     patient has had recent increase in psychosocial stressors - had a recent argument with one of her sister's in regards to patient's children and where they have been staying. She has had recent decrease in sleep as well.  Today meeting with patient she appears to have improvement in her thought process in comparison to initial reports. She acknowledges that she thinks especially having her sleep impaired recently it has had negative effects on her mental health symptoms. She is hopeful that she will be able to work with the hospital treatment team for assessment and medication management with plan to return to [facility] when discharged. She says she spoke with staff at the program today and discussed with them returning when hospitalization is complete    History of Mental Health and Chemical Dependency:    Prior diagnoses: previous psychiatric diagnoses include bipolar 1 disorder, unspecified anxiety, polysubstance abuse, alcohol abuse.   Hospitalizations: Twice at [place] for psychiatric.   Court Committments:  None per chart review  Suicide attempts: None per chart review   Self-injurious behavior: None per chart review      Guns: no  Violence: None per chart review   Has anyone hurt you physically, for example by pushing, hitting, slapping or kicking you or forcing you to have sex? Denies  Do you feel threatened or controlled by a partner,  "ex-partner or anyone in your life? Denies  ECT: None per chart review   TMS: None per chart review       Patient has history of polysubstance abuse and alcohol abuse. Currently in [facility] residential treatment program. Has been participating in their program since December.     Also had prior CD treatment in 2015 at [facility] in [place].     CD: UTOX negative upon admission and per [facility], pt has not tested positive for anything since being there. She has been active in their treatment for 5 months.   Alcohol:              -Pt reports she was never a daily drinker. She reports she would drink \"just a few drinks\" about weekly. She reports her last drink being \"not recently.\" Says it has been more than a week.   Marijuana:              -Pt reports she was a daily smoker before she got sober. She reports the last time being December 30th.  Cocaine/crack:              -Pt reports she has only tried this \"like twice.\"  Methamphetamine:   -Pt reports the last time she used meth was December 16th.   -Pt reports she has used drugs \"all ways\" including I.V. use.   -Smoker: Pt denies using Nicotine.       Family Description (Constellation, Family Psychiatric History):  Further assessment needed of family history.  kasandra indicates her  is supportive of her sobriety and is sober himself.  Children are currently in the custody of a friend - have been since she attedn started treatment  Reports she is estranged from her family in general       Significant Life Events (Illness, Abuse, Trauma, Death):       Living Situation:  Currently is in residential HEATH treatment - [facility]     Educational Background:  Further assessment needed    Occupational History:  Pt report she was previously working at [employer], but has \"resigned.\"       Financial Status:  Not working currently     Legal Issues:  Was admitted on a 72 hour hold - is now voluntary after assesemtn with psychiatrist as able to consent " and has agreed to sign in.    history of DUI charges    Ethnic/Cultural Issues:  N/a     Spiritual Orientation:  Not assessed     Service History:  None known     Social Functioning (organization, interests):  Further assessment needed     Current Treatment Providers are:    Residential HEATH program - [facility] - [phone]    Primary Care [facility]   [address] [place] [address] [phone]     Psychiatry - Shell Arndt M.D[address] [address] [phone]    Social Service Assessment/Plan:  patient is admitted for assessment / treatment of recent changes in behavior concerning for possible psychosis - prior to admission had not slept for multiple nights. Has been experiencing increased stressors in her life related to family relationships. She has been at [facility] for long term residential HEATH treatment since December - they report that mental status and behaviors leading to ED presentation were not her baseline. Today her interactions with this writer are somewhat brief as she was sleeping  - she does appear to present with clear thinking. overall plan is for medication management and assessment on unit with plan to return to residential program when clinically improved.

## 2021-04-09 NOTE — PLAN OF CARE
Work Completed  - chart review  - team meeting  - team rounds/pt interview 2  - post team rounds team meeting / discussion  - individual meeting with patient   - behavioral team discussion note completed for plan of care  - initial psychosocial assessment note completed.       Discharge plan or goal  Likely return to residential HEATH program at discharge    Barriers to discharge  Initial assessment and treatment of presenting symptoms.

## 2021-04-09 NOTE — PROGRESS NOTES
Soon after start of the shift, Pt  walked and stood in the dining area for a while, then back to her room. Pt fell asleep but when writer went to place seizure pads on the floor, she awoke and laid on the floor on top of the pads. Pt slept there for a while then laid back in bed and slept. Light snoring could be heard. Breathing was unlaboured. Observed to have slept for  5.5  hours this night.     Pt has a timed order for microscopic UA at 0600. Pt sleeping too deeply at this time.    IV discontinued, cath removed intact

## 2021-04-09 NOTE — PLAN OF CARE
"  Problem: Adult Inpatient Plan of Care  Goal: Plan of Care Review  Recent Flowsheet Documentation  Taken 4/9/2021 1500 by Beverly Gracia RN  Plan of Care Reviewed With: patient   \"I'm feeling like I've gotten some rest.\" Thoughts are more clear and is able to focus better. Writer asked about depression, \"I'm missing people.\" Denies anxiety. Denies hallucinations which is an improvement. Went to some of the groups. Took a couple naps.  "
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
## 2021-04-09 NOTE — PROGRESS NOTES
"    ----------------------------------------------------------------------------------------------------------  [facility]  Psychiatric Progress Note  Hospital Day #1    Anjali Coker MRN# [id number]   Age: 41 year old YOB: 1980   Date of Admission: 4/8/2021     Subjective   The patient was discussed with the treatment team and chart notes were reviewed.      Legal Status: Voluntary    SIO: No    Disposition/Anticipated Discharge Date: Early next week, pending clinical stabilization, medication optimization, with intention of [facility].    Vitals: VSS   Sleep:  5.5 hours (04/09/21 0550)  Prescribed Medications: Taken as prescribed  PRN Medications: acetaminophen, gabapentin, ibuprofen, OLANZapine **OR** OLANZapine, traZODone   - olanzapine 10 mg for psychosis/sleep    Yesterday's changes:   - admission  -B12, folate, TSH with free T4, CMP all within normal limits  -Routine UA pending still need to be collected    Overnight nursing notes:   Patient woke up and then all night seemingly confused.  Patient had expressed psychotic symptoms including hallucinations of seeing people down the galvan eating off the floor.  Patient was given as needed of olanzapine and trazodone, will back to sleep without incident.    Staff report:   Patient is currently on 72-hour hold and needs to sign in voluntarily.    Patient interview:  Anjali Coker states that they feel \"good/better\" compared to yesterday.  Patient was noted as being seemingly at baseline, she was oriented, linear, and not expressing symptoms of psychosis.  She was pleasant and cooperative during interview, expressing both interest and motivation for treatment while at the hospital.  She signed in voluntarily after being on a 72-hour hold.    Anjali states that her mental health has been under control for quite some time on the current medication regimen.  She had no recent changes in " "medication or any substance use leading up to the recent episode of molina/psychosis.  She is states that an argument between her and her sister about her children and  triggered her, causing her a lot of stress that prevented her from being able to sleep.  The inability to sleep caused an episode consistent with her past episodes of molina which she referred to as \"tizzies.\"  Without being prompted she suggested that her medication to be adjusted.  Patient stated that she had a lot of success being treated with olanzapine as a monotherapy several years ago.  This was discontinued due to concern for weight gain though [facility] she has been receiving counseling on lifestyle changes including diet and exercise.  She feels that olanzapine would be the best choice of medication for her and will work on lifestyle changes to prevent or minimize possible metabolic side effects.    Anjali had a hearing today for a past DWI.  She is concerned that she missed a court date and she could possibly have a bench warrant.  She was reassured that the court is forgiving of those being acutely hospitalized and that our  can work with her in the court to reschedule.      --------------------------  Review of systems:  Patient denies any bothersome physical symptoms at this time     Objective   /69   Pulse 94   Temp 97.6  F (36.4  C) (Oral)   Resp 16   Wt 121.5 kg (267 lb 12.8 oz)   LMP 03/26/2021 (Approximate)   SpO2 96%   BMI 40.72 kg/m    Weight is 267 lbs 12.8 oz  Body mass index is 40.72 kg/m .    Mental Status Examination:    Oriented to:  Grossly Oriented, Person/Self, Situation, State, City, Building, Year, Month, Day of the Week and Date  General:  Awake and Alert  Appearance:  appears stated age, Grooming is adequate and overweight  Behavior:  calm, cooperative and engaged  Eye Contact:  good  Psychomotor:  no abnormal motor symptoms appreciated no catatonia present  Speech:  " "appropriate volume/tone, spontaneous and with good articulation  Language: Fluent in English with appropriate syntax and vocabulary.  Mood:  \" Good/better\"  Affect:  congruent with mood, stable and broad/full, pleasant and reactive  Thought Process:  coherent, linear, logical and goal directed  Thought Content:  No SI/HI/AH/VH; No apparent delusions  Associations:  intact  Insight:  good   Judgment:  good  Attention Span:  grossly intact and adequate for conversation  Concentration:  grossly intact  Recent and Remote Memory:  grossly intact  Fund of Knowledge:  average       Allergies     Allergies   Allergen Reactions     Vistaril [Hydroxyzine]         Labs & Imaging     Data   Recent Results (from the past 72 hour(s))   CBC with platelets    Collection Time: 04/07/21  9:15 AM   Result Value Ref Range    WBC 7.2 4.0 - 11.0 10e9/L    RBC Count 4.33 3.8 - 5.2 10e12/L    Hemoglobin 13.1 11.7 - 15.7 g/dL    Hematocrit 40.7 35.0 - 47.0 %    MCV 94 78 - 100 fl    MCH 30.3 26.5 - 33.0 pg    MCHC 32.2 31.5 - 36.5 g/dL    RDW 12.3 10.0 - 15.0 %    Platelet Count 224 150 - 450 10e9/L   Drug abuse screen 6 urine (tox)    Collection Time: 04/08/21  1:24 PM   Result Value Ref Range    Amphetamine Qual Urine Negative NEG^Negative    Barbiturates Qual Urine Negative NEG^Negative    Benzodiazepine Qual Urine Negative NEG^Negative    Cannabinoids Qual Urine Negative NEG^Negative    Cocaine Qual Urine Negative NEG^Negative    Ethanol Qual Urine Negative NEG^Negative    Opiates Qualitative Urine Negative NEG^Negative   HCG qualitative urine    Collection Time: 04/08/21  1:24 PM   Result Value Ref Range    HCG Qual Urine Negative NEG^Negative   hCG qual urine POCT    Collection Time: 04/08/21  2:56 PM   Result Value Ref Range    HCG Qual Urine Negative neg    Internal QC OK Yes    Asymptomatic Influenza A/B & SARS-CoV2 (COVID-19) Virus PCR Multiplex    Collection Time: 04/08/21  3:07 PM    Specimen: Nasopharyngeal   Result Value Ref " Range    Flu A/B & SARS-COV-2 PCR Source Nasopharyngeal     SARS-CoV-2 PCR Result NEGATIVE     Influenza A PCR Negative NEG^Negative    Influenza B PCR Negative NEG^Negative    Respiratory Syncytial Virus PCR (Note)     Flu A/B & SARS-CoV-2 PCR Comment (Note)    Comprehensive metabolic panel    Collection Time: 04/09/21  7:51 AM   Result Value Ref Range    Sodium 138 133 - 144 mmol/L    Potassium 3.9 3.4 - 5.3 mmol/L    Chloride 106 94 - 109 mmol/L    Carbon Dioxide 24 20 - 32 mmol/L    Anion Gap 8 3 - 14 mmol/L    Glucose 104 (H) 70 - 99 mg/dL    Urea Nitrogen 14 7 - 30 mg/dL    Creatinine 0.66 0.52 - 1.04 mg/dL    GFR Estimate >90 >60 mL/min/[1.73_m2]    GFR Estimate If Black >90 >60 mL/min/[1.73_m2]    Calcium 8.6 8.5 - 10.1 mg/dL    Bilirubin Total 0.5 0.2 - 1.3 mg/dL    Albumin 3.7 3.4 - 5.0 g/dL    Protein Total 7.3 6.8 - 8.8 g/dL    Alkaline Phosphatase 66 40 - 150 U/L    ALT 36 0 - 50 U/L    AST 14 0 - 45 U/L   TSH with free T4 reflex and/or T3 as indicated    Collection Time: 04/09/21  7:51 AM   Result Value Ref Range    TSH 1.01 0.40 - 4.00 mU/L   Folate    Collection Time: 04/09/21  7:51 AM   Result Value Ref Range    Folate 52.6 >5.4 ng/mL   Vitamin B12    Collection Time: 04/09/21  7:51 AM   Result Value Ref Range    Vitamin B12 506 193 - 986 pg/mL     Pending Results      Assessment     Primary psychiatric diagnoses:   # Bipolar I disorder, current episode mixed with psychotic features    Secondary psychiatric diagnoses of concern this admission:   # Alcohol abuse uncomplicated  # polysubstance abuse, in remission    Diagnostic Impression:   Anjali Coker is a 41 year old  female with a past psychiatric history of Bipolar 1 disorder with psychotic features admitted from the  ER on 04/08/2021 due to concern for psychosis and molina after 2 days of not sleeping in the context of medication adherence and psychosocial stressors including family dynamics. Significant symptoms include depressed,  neurovegetative symptoms, sleep issues, psychosis and disorganization.  She is currently living at adult in [facility] for CD treatment and followed by Dr. Goff and Dr. Beatty (psych and PCP). Current psychosocial stressors include family dynamics.  Patient's support system includes outpatient team.  Substance use does not appear to be playing a contributing role in the patient's presentation. Medical history does not appear to be significant for developmental delay and thyroid disorder. The MSE is notable for excessive drowsiness and somnolence and while awake having a nonreactive and flat affect. She denies self injurious behaviors. Her reported symptoms of depression, insomnia, confusion, and disorganization are consistent with her historic diagnosis of bipolar 1 disorder, mixed episode with psychotic features.    PTA Medications:   Prior to Admission Medications   Prescriptions Last Dose Informant Patient Reported? Taking?   ARIPiprazole (ABILIFY) 10 MG tablet 4/8/2021 at 2232 half-way Yes Yes   Sig: Take 10 mg by mouth At Bedtime    acetaminophen (TYLENOL) 500 MG tablet 4/7/2021 at 0631 half-way Yes Yes   Sig: Take 500-1,000 mg by mouth every 6 hours as needed for mild pain   atomoxetine (STRATTERA) 80 MG capsule 4/9/2021 at 0830 half-way Yes Yes   Sig: Take 80 mg by mouth daily   escitalopram (LEXAPRO) 20 MG tablet 4/9/2021 at 0830 half-way Yes Yes   Sig: Take 20 mg by mouth At Bedtime    ibuprofen (ADVIL/MOTRIN) 200 MG tablet More than a month at Unknown time half-way Yes No   Sig: Take 200 mg by mouth every 4 hours as needed for mild pain   valACYclovir (VALTREX) 500 MG tablet 4/9/2021 at 0830 half-way Yes Yes   Sig: Take 500 mg by mouth every evening       Facility-Administered Medications: None        Psychiatric course:  Anjali Coker was admitted to station 20 with attending Theo Sheehan MD on a 72 hour mental health hold and later signed in voluntary, and was treated in  "the therapeutic milieu with appropriate individual and group therapies.     The day after admission, patient was noted as being seemingly at baseline, she was oriented, linear, and not expressing symptoms of psychosis.  This is in the context of receiving olanzapine as needed during the night for waking up being confused and expressing visual hallucinations.  After getting a good night sleep she was awake/alert as well as pleasant and cooperative during interview, expressing both interest and motivation for treatment while at the hospital.  She signed in voluntarily after being on a 72-hour hold.   Anjali states that her mental health has been under control for quite some time on the current medication regimen.  She had no recent changes in medication or any substance use leading up to the recent episode of molina/psychosis.  She is states that an argument between her and her sister about her children and  triggered her, causing her a lot of stress that prevented her from being able to sleep.  The inability to sleep caused an episode consistent with her past episodes of molina which she referred to as \"tizzies.\"  Without being prompted she suggested that her medication to be adjusted.  Patient stated that she had a lot of success being treated with olanzapine as a monotherapy several years ago.  This was discontinued due to concern for weight gain though [facility] she has been receiving counseling on lifestyle changes including diet and exercise.  She feels that olanzapine would be the best choice of medication for her and will work on lifestyle changes to prevent or minimize possible metabolic side effects.  Her PTA aripiprazole was discontinued and she started on 10 mg of olanzapine nightly.                   Anjali Coker continued to meet criteria for inpatient hospitalization medication optimization, inpatient stabilization, and appropriate discharge planning.     Collateral: "   n/a    Medical course:   Anjali Coker was physically examined by the ED prior to being transferred to the unit and was found to be medically stable and appropriate for admission.     Consults:   - n/a     Plan       Continue to monitor for and stabilize: sleep issues, psychosis and disorganization      Today's Changes:   -Discontinued PTA aripiprazole  -Started olanzapine 10 mg nightly  -Decreased PT atomoxetine to 40 mg (was 80 mg)  -Patient signed in voluntarily after being on a 72-hour hold      Continue:    Psychiatric diagnoses and management:    # BP1  -Olanzapine 10 mg nightly  -Escitalopram 20 mg daily    #ADHD  -Atomoxetine 40 mg daily    #Anxiety/agitation  -Gabapentin as needed (patient is allergic to hydroxyzine)    -- PRN meds; acetaminophen, gabapentin, ibuprofen, OLANZapine **OR** OLANZapine, traZODone      Pertinent Medical diagnoses and management:    #Herpes simplex  -Valacyclovir 500 mg daily    #Insomnia  -Melatonin 3 mg  -Trazodone as needed    Discontinued Medications (& Rationale):  -PTA aripiprazole, patient has been stable on it for a couple years though her threshold to experience a manic episode with psychosocial stressors was still low on this medication, patient states having prior and better success on olanzapine which was discontinued for metabolic side effects.        Safety Assessment:   Behavioral Orders   Procedures     Code 1 - Restrict to Unit     Routine Programming     As clinically indicated     Single Room     Status 15     Every 15 minutes.          Patient seen and discussed with my attending physician, Dr. Theo Sheehan MD who is in agreement with my assessment and plan.      Criselda Sparrow MD  PGY-1 Psychiatry Resident      Attestation:  I, Theo Sheehan MD, have personally performed an examination of this patient and I have reviewed the resident's documentation.  I have edited the note to reflect all relevant changes.  I have discussed this patient with the house  staff on 4/9/2021.  I agree with resident findings and plan in today's note and yesterdays resident H&P.  I have reviewed all vitals and laboratory findings.      I certifiy that the inpatient services were ordered in accordance with the Medicare regulations governing the order. This includes certification that hospital inpatient services are reasonable and necessary and in the case of services not specified as inpatient-only under 42 .22(n), that they are appropriately provided as inpatient services in accordance with the 2-midnight benchmark under 42 .3(e).     The reason for inpatient status is Acute Psychosis.    Theo Sheehan M.D.,Ph.D.

## 2021-04-09 NOTE — PHARMACY-ADMISSION MEDICATION HISTORY
Admission Medication History Completed by Pharmacy    See [facility] Admission Navigator for allergy information, preferred outpatient pharmacy, prior to admission medications and immunization status.     Medication History Sources:     [place] [facility] (spoke w/ Lisa via telephone)    Changes made to PTA medication list (reason):    Added: None    Deleted: None    Changed: Per facility MAR  o Patient takes aripiprazole, escitalopram, and valacyclovir at night    Additional Information:    None    Prior to Admission medications    Medication Sig Last Dose Taking? Auth Provider   acetaminophen (TYLENOL) 500 MG tablet Take 500-1,000 mg by mouth every 6 hours as needed for mild pain 4/7/2021 at 0631 Yes Reported, Patient   ARIPiprazole (ABILIFY) 10 MG tablet Take 10 mg by mouth At Bedtime  4/8/2021 at 2232 Yes Reported, Patient   atomoxetine (STRATTERA) 80 MG capsule Take 80 mg by mouth daily 4/9/2021 at 0830 Yes Reported, Patient   escitalopram (LEXAPRO) 20 MG tablet Take 20 mg by mouth At Bedtime  4/9/2021 at 0830 Yes Reported, Patient   melatonin 3 MG tablet Take 1 tablet (3 mg) by mouth nightly as needed for sleep  Yes Joy Jennings MD   valACYclovir (VALTREX) 500 MG tablet Take 500 mg by mouth every evening  4/9/2021 at 0830 Yes Reported, Patient   ibuprofen (ADVIL/MOTRIN) 200 MG tablet Take 200 mg by mouth every 4 hours as needed for mild pain More than a month at Unknown time  Reported, Patient       Date completed: 04/09/21    Medication history completed by:     Nicollette McMann, PharmD  [facility]  Emergency Department: Ascom *[id number]

## 2021-04-10 LAB
ALBUMIN UR-MCNC: NEGATIVE MG/DL
APPEARANCE UR: CLEAR
BACTERIA #/AREA URNS HPF: ABNORMAL /HPF
BILIRUB UR QL STRIP: NEGATIVE
COLOR UR AUTO: ABNORMAL
GLUCOSE UR STRIP-MCNC: NEGATIVE MG/DL
HGB UR QL STRIP: NEGATIVE
KETONES UR STRIP-MCNC: NEGATIVE MG/DL
LEUKOCYTE ESTERASE UR QL STRIP: NEGATIVE
MUCOUS THREADS #/AREA URNS LPF: PRESENT /LPF
NITRATE UR QL: NEGATIVE
PH UR STRIP: 5.5 PH (ref 5–7)
RBC #/AREA URNS AUTO: 1 /HPF (ref 0–2)
SOURCE: ABNORMAL
SP GR UR STRIP: 1.01 (ref 1–1.03)
SQUAMOUS #/AREA URNS AUTO: 1 /HPF (ref 0–1)
UROBILINOGEN UR STRIP-MCNC: NORMAL MG/DL (ref 0–2)
WBC #/AREA URNS AUTO: 1 /HPF (ref 0–5)

## 2021-04-10 PROCEDURE — 250N000013 HC RX MED GY IP 250 OP 250 PS 637: Performed by: STUDENT IN AN ORGANIZED HEALTH CARE EDUCATION/TRAINING PROGRAM

## 2021-04-10 PROCEDURE — G0177 OPPS/PHP; TRAIN & EDUC SERV: HCPCS

## 2021-04-10 PROCEDURE — 81001 URINALYSIS AUTO W/SCOPE: CPT | Performed by: PSYCHIATRY & NEUROLOGY

## 2021-04-10 PROCEDURE — 124N000002 HC R&B MH UMMC

## 2021-04-10 RX ADMIN — VALACYCLOVIR HYDROCHLORIDE 500 MG: 500 TABLET, FILM COATED ORAL at 08:31

## 2021-04-10 RX ADMIN — OLANZAPINE 10 MG: 10 TABLET, FILM COATED ORAL at 20:55

## 2021-04-10 RX ADMIN — MELATONIN TAB 3 MG 3 MG: 3 TAB at 20:55

## 2021-04-10 RX ADMIN — ATOMOXETINE 40 MG: 40 CAPSULE ORAL at 08:31

## 2021-04-10 RX ADMIN — ESCITALOPRAM 20 MG: 5 TABLET, FILM COATED ORAL at 08:31

## 2021-04-10 ASSESSMENT — ACTIVITIES OF DAILY LIVING (ADL)
ORAL_HYGIENE: INDEPENDENT
HYGIENE/GROOMING: INDEPENDENT
DRESS: STREET CLOTHES;INDEPENDENT

## 2021-04-10 NOTE — PLAN OF CARE
"  Problem: Adult Inpatient Plan of Care  Goal: Optimal Comfort and Wellbeing  Outcome: Improving   \"I feel like I'm back to myself. I slept most the day yesterday and I slept well last night. Before I came in I had a phone call with my sister and I told her I was trying to work things out with my  and she didn't like what I was saying  and she said some things. She can be judgemental. I couldn't sleep after that.\" Denies depression, anxiety, racing thoughts, hallucinations. Thoughts are clear and focusing is good. Went to OT group. Is social with select peers.   "

## 2021-04-10 NOTE — PROGRESS NOTES
Anjali appeared to sleep a total of 6.75 hours this night shift.  Appears comfortable.  Awoke early and now in the lounge visiting appropriately with another patient.  No prns or snacks given or requested.   Unknown

## 2021-04-10 NOTE — PROGRESS NOTES
"Initially seen by OT on this date. Anjali participated in OT clinic this a.m., where she initiated a chosen project (sticker mosaic), followed through with plan, and asked for support with supplies as needed. Reported that an upsetting conversation with her sister last Tuesday \"triggered the current episode,\" which she did not describe in any greater detail. Pt chatted casually with peers and selected several songs online to share with the group. More observation needed to complete initial evaluation at this time.      04/10/21 1200   Occupational Therapy   Type of Intervention structured groups   Response Initiates, socially acceptable   Hours 1     "

## 2021-04-10 NOTE — PLAN OF CARE
Pt has been sleeping for a good part of the evening , out for meals and snacks . She feels she is catching up on her sleep. She denies si , med compliant . States she likes [facility] that it has been helpful.. She states she has 9 children between her and her  from ages 4 to 27

## 2021-04-11 PROCEDURE — H2032 ACTIVITY THERAPY, PER 15 MIN: HCPCS

## 2021-04-11 PROCEDURE — 250N000013 HC RX MED GY IP 250 OP 250 PS 637: Performed by: STUDENT IN AN ORGANIZED HEALTH CARE EDUCATION/TRAINING PROGRAM

## 2021-04-11 PROCEDURE — 124N000002 HC R&B MH UMMC

## 2021-04-11 RX ORDER — OLANZAPINE 10 MG/1
10 TABLET ORAL AT BEDTIME
Qty: 30 TABLET | Refills: 0 | Status: SHIPPED | OUTPATIENT
Start: 2021-04-11 | End: 2021-05-04

## 2021-04-11 RX ORDER — ESCITALOPRAM OXALATE 20 MG/1
20 TABLET ORAL DAILY
COMMUNITY
Start: 2021-04-11

## 2021-04-11 RX ORDER — LANOLIN ALCOHOL/MO/W.PET/CERES
3 CREAM (GRAM) TOPICAL
Qty: 14 TABLET | Refills: 0 | Status: SHIPPED | OUTPATIENT
Start: 2021-04-11 | End: 2021-05-21

## 2021-04-11 RX ORDER — ATOMOXETINE 40 MG/1
40 CAPSULE ORAL DAILY
Qty: 30 CAPSULE | Refills: 0 | Status: SHIPPED | OUTPATIENT
Start: 2021-04-12 | End: 2021-05-21

## 2021-04-11 RX ADMIN — ATOMOXETINE 40 MG: 40 CAPSULE ORAL at 07:46

## 2021-04-11 RX ADMIN — OLANZAPINE 10 MG: 10 TABLET, FILM COATED ORAL at 21:28

## 2021-04-11 RX ADMIN — MELATONIN TAB 3 MG 3 MG: 3 TAB at 21:28

## 2021-04-11 RX ADMIN — ESCITALOPRAM 20 MG: 5 TABLET, FILM COATED ORAL at 07:46

## 2021-04-11 RX ADMIN — VALACYCLOVIR HYDROCHLORIDE 500 MG: 500 TABLET, FILM COATED ORAL at 07:46

## 2021-04-11 ASSESSMENT — ACTIVITIES OF DAILY LIVING (ADL)
DRESS: INDEPENDENT
ORAL_HYGIENE: INDEPENDENT
HYGIENE/GROOMING: INDEPENDENT

## 2021-04-11 NOTE — DISCHARGE INSTRUCTIONS
Behavioral Discharge Planning and Instructions    Summary: You were admitted on 4/8/2021  due to Psychotic Symptomology.  You were treated by Dr. Theo Sheehan and [facility] on 4/12/2021 from  [facility]     Main Diagnosis: Bipolar Disorder    Health Care Follow-up:     Dr. Shell Pagan [address] [address] [phone]  You will schedule a follow up appointment when you return to your residential treatment program.        Attend all scheduled appointments with your outpatient providers. Call at least 24 hours in advance if you need to reschedule an appointment to ensure continued access to your outpatient providers.     Major Treatments, Procedures and Findings:  You were provided with: a psychiatric assessment, assessed for medical stability, medication evaluation and/or management and group therapy    Symptoms to Report: feeling more aggressive, increased confusion, losing more sleep or thoughts of suicide    Early warning signs can include: increased depression or anxiety sleep disturbances increased unusual thinking, such as paranoia or hearing voices    Safety and Wellness:  Take all medicines as directed.  Make no changes unless your doctor suggests them.      Follow treatment recommendations.  Refrain from alcohol and non-prescribed drugs.  If there is a concern for safety, call 171.    Resources:   Crisis Intervention: 276.742.5682 or 891-067-8138 (TTY: 188.181.4853).  Call anytime for help.  National [place] on [place] Illness (www.[place].m[place].org): 919.621.6732 or 536-070-7564.  Suicide Awareness Voices of Education (SAVE) (www.save.org): 719-948-IFQD (8583)  National Suicide Prevention Line (www.mentalhealthmn.org): 927-161-UPBV (0127)  Mental Health Consumer/Survivor Network of [place] (www.mhcsn.net): 413.427.4606 or 875-745-3956  Mental Health Association of [place] (www.mentalhealth.org): 146.744.5504 or 647-511-7850  Self- Management and  "Recovery Training., SMART-- Toll free: 427.770.5925  www.[employer]  [facility] Crisis (COPE) Response - Adult 572 113-2670  [facility] Crisis Response - Adult 969 536-1839  Text 4 Life: txt \"LIFE\" to 00563 for immediate support and crisis intervention  Crisis text line: Text \"[place]\" to 772624. Free, confidential, 24/7.  Crisis Intervention: 695.718.4846 or 492-356-5500. Call anytime for help.     General Medication Instructions:   See your medication sheet(s) for instructions.   Take all medicines as directed.  Make no changes unless your doctor suggests them.   Go to all your doctor visits.  Be sure to have all your required lab tests. This way, your medicines can be refilled on time.  Do not use any drugs not prescribed by your doctor.  Avoid alcohol.    Advance Directives:   Scanned document on file with [facility]? No scanned doc  Is document scanned? Pt states no documents  Honoring Choices Your Rights Handout: Informed and given  Was more information offered? Pt declined    The Treatment team has appreciated the opportunity to work with you. If you have any questions or concerns about your recent admission, you can contact the unit which can receive your call 24 hours a day, 7 days a week. They will be able to get in touch with a Provider if needed. The unit number is [phone] .        "

## 2021-04-11 NOTE — PLAN OF CARE
Pt states she is feeling very good , has no complaints , denies si , med compliant , [facility]
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
## 2021-04-11 NOTE — PLAN OF CARE
Night Shift Summary (4/10/21 into 04/11/21)    Pt in bed sleeping at start of shift. Breathing quiet and unlabored. Pt woke up first around 0345. Stated that she usually sleeps 9-5 when at home. Ate a snack and fell back asleep for a short time. Awake again at 0445. Stated that she has not been sleeping well because of an uncomfortable mattress. Writer offered to order her an egg crate mattress. Pt stated that she is hoping that she just goes home today or tomorrow. Felt bored and played cards by herself in the dining area. Appears to have slept 5 hours.    No precautions in addition to Single Room order, with no related events occurring this shift.     Will continue to monitor and assess.       Problem: Behavioral Health Plan of Care  Goal: Adheres to Safety Considerations for Self and Others  Outcome: Improving  Goal: Absence of New-Onset Illness or Injury  Outcome: Improving

## 2021-04-11 NOTE — PLAN OF CARE
Pt states she talked to [facility] and they said she can return tomorrow between 11Am and 1 Pm , she states her discharge meds have already been called in to her pharmacy . Pt feels good , denies si and all mental health symptoms and is very happy to return to [facility]

## 2021-04-11 NOTE — PLAN OF CARE
"Patient is pleasant and cooperative, med compliant, social during meal times. Pt identified coping strategies including listening to Sabianism music and journaling. Pt endorses anxiety and depression because she \"feels worse here\". Pt denied SI, SIB, HI, and AVH. Patient describes mood as \"anxious to go home\" and asked if she could be discharged today. Dr. Menchaca was contacted and ultimately decided to not discharge today. No PRNs given during shift.     Problem: Adult Inpatient Plan of Care  Goal: Optimal Comfort and Wellbeing  Outcome: Improving     Problem: Adult Inpatient Plan of Care  Goal: Plan of Care Review  Recent Flowsheet Documentation  Taken 4/11/2021 1000 by Meagan Summers RN  Plan of Care Reviewed With: patient     Problem: Behavioral Health Plan of Care  Goal: Plan of Care Review  Recent Flowsheet Documentation  Taken 4/11/2021 1000 by Meagan Summers RN  Plan of Care Reviewed With: patient  Patient Agreement with Plan of Care: agrees     "

## 2021-04-12 VITALS
HEART RATE: 97 BPM | SYSTOLIC BLOOD PRESSURE: 114 MMHG | BODY MASS INDEX: 40.95 KG/M2 | DIASTOLIC BLOOD PRESSURE: 79 MMHG | WEIGHT: 269.3 LBS | TEMPERATURE: 98.3 F | OXYGEN SATURATION: 98 % | RESPIRATION RATE: 14 BRPM

## 2021-04-12 PROCEDURE — 99238 HOSP IP/OBS DSCHRG MGMT 30/<: CPT | Mod: GC | Performed by: PSYCHIATRY & NEUROLOGY

## 2021-04-12 PROCEDURE — 250N000013 HC RX MED GY IP 250 OP 250 PS 637: Performed by: STUDENT IN AN ORGANIZED HEALTH CARE EDUCATION/TRAINING PROGRAM

## 2021-04-12 PROCEDURE — G0177 OPPS/PHP; TRAIN & EDUC SERV: HCPCS

## 2021-04-12 RX ADMIN — VALACYCLOVIR HYDROCHLORIDE 500 MG: 500 TABLET, FILM COATED ORAL at 08:19

## 2021-04-12 RX ADMIN — ATOMOXETINE 40 MG: 40 CAPSULE ORAL at 08:19

## 2021-04-12 RX ADMIN — ESCITALOPRAM 20 MG: 5 TABLET, FILM COATED ORAL at 08:20

## 2021-04-12 ASSESSMENT — ACTIVITIES OF DAILY LIVING (ADL)
HYGIENE/GROOMING: INDEPENDENT
DRESS: STREET CLOTHES;INDEPENDENT
ORAL_HYGIENE: INDEPENDENT

## 2021-04-12 NOTE — PLAN OF CARE
Pt has slow delayed speech , complaints of being overwhelmed by sounds and bright lights . Frequent requests for prn for anxiety and agitation . UA sent .  States she wants to ask for a female resident  tomorrow that she would be more comfortable with a female

## 2021-04-12 NOTE — PLAN OF CARE
Pt participated in OT clinic IND, where she initiated a chosen project (paint by sticker), followed through with plan, and asked for support with supplies as needed. Selected several songs online to share with the group. Pt reports she plans to discharge later today back to [facility].

## 2021-04-12 NOTE — PLAN OF CARE
Problem: Adult Inpatient Plan of Care  Goal: Optimal Comfort and Wellbeing  Outcome: Adequate for Discharge   Denies depression, states is anxious to get back to [facility]. Thoughts are at baseline. Denies hallucinations. Read over discharge instructions with patient and answered questions. Was discharged at 1320 with Staff from [facility].

## 2021-04-12 NOTE — PLAN OF CARE
Night Shift Summary (4/11/21 into 04/12/21)    Pt in bed sleeping at start of shift. Breathing quiet and unlabored. Out around 0415 stating having gone to bed early. States that she usually wakes up at this time at home due to her daily schedule. Pt looking forward to showering at 0730, as she is hoping to discharge today. Appears to have slept 5.75 hours.    On No precautions in addition to Single Room order, with no related events occurring this shift.     Will continue to monitor and assess.       Problem: Behavioral Health Plan of Care  Goal: Adheres to Safety Considerations for Self and Others  Outcome: Improving  Goal: Absence of New-Onset Illness or Injury  Outcome: Improving

## 2021-04-12 NOTE — PLAN OF CARE
Work Completed  - chart review  - team meeting -  Discussion of patient's request / desire to discharge to return to residential HEATH program today - tentative plan to do so , providers will meet with patient to assess.  - team rounds/pt interview   - post team rounds team meeting / discussion  - spoke with director at [facility] - discussed provider recommendation for discharge - discussed medication changes as reported by provider. Planned for  and gave instructions for .   - discharge instructions reviewed and updated      Discharge plan or goal  Discharge today to return to residential HEATH program     Barriers to discharge  None will discharge today

## 2021-04-12 NOTE — PLAN OF CARE
"Music Therapy Group note    Total time in session: 55 minutes    Number of patients in group: 6    Scope of service: holistic    Patient progress: initial encounter    Patient response/reaction to treatment intervention(s): Anjali was bright and engaged in group, stating how \"it's so good to hear popular music! I live at [facility]..\"  She appeared to enjoy and benefit from group and was a calm, positive, contributing participant.     Altagracia Platt, MT-BC  Board-Certified Music Therapist           "

## 2021-04-12 NOTE — DISCHARGE SUMMARY
"      ----------------------------------------------------------------------------------------------------------  [facility], [place]   Discharge Summary  Hospital Day #4    Anjali Coker   MRN# [id number]   Age: 41 year old YOB: 1980   Date of Admission:  4/8/2021  Date of Discharge:  4/12/2021  1:15 PM  Admitting Physician:  Theo Sheehan MD  Discharge Physician:  Theo Sheehan MD     Event Leading to Hospitalization:     History obtained from electronic chart     Anjali Coker is a 41 year old  female with a past psychiatric history of Bipolar 1 disorder with psychotic features admitted from the  ER on 04/08/2021 due to concern for psychosis and molina after 2 days of not sleeping in the context of medication adherence and psychosocial stressors including family dynamics.      Per ED Note:   \"Anjali Coker is a 41 year old female with a PMH of severe bipolar 1, psychosis, MDD, thyromegaly and polysubstance abuse who presents to the ED today complaining of anxiety and seeking mental health evaluation.  Patient states she has been confused and has not been sleeping. She states she was stuck in an elevator \"quite some time ago\".  Patient denies being anxious, stating she just feels confused.  She states she was hoping for healing here in the ED.  Patient then states she was coming back for vaccine follow-up when she came here.  She states she got her vaccine shot yesterday, and states she got another today.  When again asked why she came to the ED today she states it was because she was \"reading, filling face with food, and hanging out with friends\".  Patient denies suicidal ideation. History is difficult to obtain as the patient is mostly rocking back and forth on the rocking chair.  It takes her 15 seconds or more to answer question and then she does so monosyllabicall.\"  \"The patient was seen by [facility]  Johnnie.  Please see his documentation for " details  Per Johnnie, patient is currently living in a sober house and is connected to outpatient long-term chemical dependency treatment through [facility].  Patient has a history of substance abuse, primarily methamphetamine and marijuana.  She also has a historical diagnosis of bipolar disorder.  Patient is currently prescribed Abilify 15 mg and Lexapro 20 mg, both of which she takes at night.  Patient also takes Strattera 80 mg which was started in December for seizure risk due to substance abuse.  Patient states she has not slept in 2 days, and attributes this to recent family tension.  She states she recently had an argument with her sister about her children's custody after she completes treatment.  Patient is currently estranged from her family due to her drug and alcohol abuse.  Patient is temporarily lost custody of her children due to her substance abuse.  Her children have been placed with a coworker of hers who planned to keep them until they finish school.  The patient and her children will then moved in with her sister in [facility].  Patient is currently been in treatment since December of last year.  Here in the ED, patient denies suicidal ideation.  Patient has been through treatment 3 times starting in 2015, with this being [facility]  Patient currently has a psychiatrist (Mo Arndt) at the [facility], and has an appointment with him tomorrow.  Patient states she was told she may need a booster for her hepatitis vaccine, and that is why she came in today.  Patient states she would like a medication for sleep.  She states she has tried trazodone and hydroxyzine in the past, but did not like how they made her feel.  Patient also acknowledged heightened anxiety and insomnia.  Patient was recently seen in office visit yesterday on 4/7/2021 for a preop exam for a hysteroscopy with dilation and curettage that she is scheduled for  "5/5/2021.  The patient is a 41-year-old female with history of substance abuse, bipolar disorder, and anxiety presents to the ED for unclear reasons.  Apparently, she would like something to help her sleep.  She does not want trazodone or hydroxyzine.  I prescribed melatonin to see if this would help her.  She does have an appointment with her psychiatrist tomorrow.  She will be discharged from the ED with close outpatient follow-up.  Apparently, shortly after the patient was discharged, she was found outside picking up trash.  It is raining out and she is quite wet.  Her group home has refused to take her back; apparently, they feel she is too manic to cope.  Per  interview at 11:10am:  Came in due to anxiety and to get hepatitis follow-up vaccination.   Pt was initially slow in responses, however this improved after the first 10 minutes of conversation. When discharged from ED earlier today, staff found her wandering outside the main door in the rain picking up trash. [facility] said they sent her in to the ED here due to bizarre behavior overnight. She was found in the elevator of the residence with a fire extinguisher overnight.  She was returned to her room and later was found in bed looking out the window at night with sunglasses on. She was unresponsive to external stimuli. She also had episodes of yelling and complaining, and seemed to be responding to internal stimuli. [facility] states they do not feel safe for her to be discharged back to them. They state they have observed her taking her medications, and she was breathalyzed this morning at 0.0.  [facility] also does random UAs, with the most recent being 2 weeks ago and negative for drug use.  Her UA was also negative today.  Her Patient has made statements like \"It's time to slay the dragon.\"  Per , patient seems psychotic.  After further evaluation, I made the decision to admit the patient on a 72-hour hold.  She is " "bipolar and clearly manic.  She is occasionally unresponsive to external stimuli.  She has been behaving oddly and her group home is not able to manage her.  I believe the patient would benefit from mental health stabilization.  She is admitted here to [facility] on a 72-hour hold\"     She was medically cleared for admission to inpatient psychiatric unit.     Per DEC:  Hx of BP1, alcohol and polysubstance abuse. 2 prior admission in [place] and 2 prior CD treatments. Pt currently lives at [facility] since December in their long-term residential program. Her children have been placed with a firned and she is alienated from her family due to her hx of susbtance abuse. Pt saw her pcp ysterday for pre-op visit. She is scheduled for a hyterectomy on 5/5 to remove a damaged IUD. She also complains about needing a booster vaccination for Hep A, which was her primary reason to come to the ED. Negative for A, B, and C in past month.   Pt stated she had not slept the last two nights. Pt was sent here by [facility]. She was found in elevator last night holding a fire extinguisher. She would not respond to staff questions. She was later seen sitting on her bed staring out a window at night wearing her sunglasses.  She would periodically yell about the behavior of other residents saying that she needed to protect them at 1 point stating that it was \"time to minoo Hurtado.\"  In the morning while being questioned about her behaviors last night she asked staff to give her a pair of scissors so she could groom her private area.  Patient says that she attends all groups in the treatment program and is working on relapse prevention.  She twice referred to treatment as her school program.  Patient denies that she is suicidal and does not want to be admitted she just wants something to help her sleep.  She expects to complete treatment in a year then moved her sisters in [facility], where she plans to move " her children in the summer after they finished school.  She gave custody of her children to a former coworker while she was in treatment.  When asked why she is having more trouble with sleep this week she references an argument she had with a different sister 2 days ago over where she has placed her children currently and in the future.  She says that she is estranged from her family in general.  Writer staffed the patient with the ED physician and plan was for patient to be discharged back to the facility.  Patient told writer she had a phone number to call the treatment program for a ride back but after discharge she was seen wandering outside the doors and pouring rain picking up trash from the property.  When treatment staff called they said the patient is to unstable to return safely to the program.  They indicate behavior of patient yesterday and today is not her baseline.  They have no reason to believe that she is off her medications or has used any illegal substances.  Patient's urine toxicology is negative for all substances.  Given that safe discharge back to treatment program is not possible and due to patient previously stating she is not interested in admission, patient was placed on a 72-hour hold.     Per patient report:    Anjali Coker was interviewed over telemedicine due to COVID.  She was seen lying in bed with her blankets covering her face.  Prior to her interview she was sleeping and woke up by nursing.  Anjali appeared somnolent and had difficulty staying awake when being asked questions.  Of the few questions she was able to answer she did so with short terse answers that were mostly vague.  Her speech was soft volume with paucity and at times mumbled being difficult to understand.  Anjali states that she initially came to the hospital because she had not slept for the past 2 days.  When asked if there was any precipitating factor she could not provide an answer.  Patient stated that  "she is just really tired and needs sleep.  She was reassured and interviewed be short though with any further questioning she would nod off.  Patient had no acute concerns or complaints other than requesting sleep.  Discussion with nursing staff revealed that patient had difficulty staying awake with intake interview when she got to the unit.    See Admission note by Theo Sheehan MD on 4/8/2021 for additional details.      Objective:   /79 (BP Location: Left arm)   Pulse 97   Temp 98.3  F (36.8  C) (Oral)   Resp 14   Wt 122.2 kg (269 lb 4.8 oz)   LMP 03/26/2021 (Approximate)   SpO2 98%   BMI 40.95 kg/m    Weight is 269 lbs 4.8 oz  Body mass index is 40.95 kg/m .    Mental Status Examination:    Oriented to:  Grossly Oriented  General: Awake and Alert  Appearance:  appears stated age and Grooming is adequate  Behavior:  calm, cooperative and engaged  Eye Contact:  good  Psychomotor:  no abnormal motor symptoms appreciated; no catatonia present  Speech:  appropriate volume/tone, spontaneous and with good articulation  Language: Fluent in English with appropriate syntax and vocabulary.  Mood:  \"good\" but \"anxious about discharge\"  Affect:  appropriate, congruent with mood, stable and broad/full, pleasant  Thought Process:  coherent, logical and goal directed  Thought Content: No SI/HI/AH/VH; No apparent delusions  Associations:  intact  Insight:  good  Judgment:  good  Attention Span: grossly intact  Concentration:  grossly intact  Recent and Remote Memory:  grossly intact  Fund of Knowledge: average       Patient's Strengths & Goals:     Patient stated that Interpersonal relationships and supports (family, friends, peers) and Cultural/spiritual/Jewish and community involvement are personal strengths and/or positive aspects of their life.    Patient stated that their goal(s) for treatment were: Be in a place where [facility] is willing to accept her again       Diagnoses:     Primary psychiatric " diagnoses:   #Bipolar I disorder, current episode mixed with psychotic features     Secondary psychiatric diagnoses of concern this admission:   # Alcohol abuse uncomplicated  # polysubstance abuse, in remission     Hospital Course:     Diagnostic Impression:                Anjali Coker is a 41 year old  female with a past psychiatric history of Bipolar 1 disorder with psychotic features admitted from the  ER on 04/08/2021 due to concern for psychosis and molina after 2 days of not sleeping in the context of medication adherence and psychosocial stressors including family dynamics. Significant symptoms include depressed, neurovegetative symptoms, sleep issues, psychosis and disorganization.  She is currently living at adult in [facility] for CD treatment and followed by Dr. Goff and Dr. Beatty (psych and PCP). Current psychosocial stressors include family dynamics.  Patient's support system includes outpatient team.  Substance use does not appear to be playing a contributing role in the patient's presentation. Medical history does not appear to be significant for developmental delay and thyroid disorder. The MSE is notable for excessive drowsiness and somnolence and while awake having a nonreactive and flat affect. She denies self injurious behaviors.   Her reported symptoms of depression, insomnia, confusion, and disorganization are consistent with her historic diagnosis of bipolar 1 disorder, mixed episode with psychotic features.    Prior to Admission Medications   Prescriptions Last Dose Informant Patient Reported? Taking?   ARIPiprazole (ABILIFY) 10 MG tablet 4/8/2021 at 2232 Nursing Home Yes No   Sig: Take 10 mg by mouth At Bedtime    acetaminophen (TYLENOL) 500 MG tablet 4/7/2021 at 0631 custodial Yes Yes   Sig: Take 500-1,000 mg by mouth every 6 hours as needed for mild pain   atomoxetine (STRATTERA) 80 MG capsule 4/9/2021 at 0830 Nursing Home Yes No   Sig: Take 80 mg by mouth daily  "  escitalopram (LEXAPRO) 20 MG tablet 4/9/2021 at 0830 Nursing Home Yes No   Sig: Take 20 mg by mouth At Bedtime    escitalopram (LEXAPRO) 20 MG tablet   Yes Yes   Sig: Take 1 tablet (20 mg) by mouth daily   ibuprofen (ADVIL/MOTRIN) 200 MG tablet More than a month at Unknown time shelter Yes No   Sig: Take 200 mg by mouth every 4 hours as needed for mild pain   valACYclovir (VALTREX) 500 MG tablet 4/9/2021 at 0830 shelter Yes Yes   Sig: Take 500 mg by mouth every evening       Facility-Administered Medications: None          Psychiatric Course:   Anjali Coker was admitted to station 20 with attending Theo Sheehan MD on a 72 hour mental health hold and later signed involuntary, and was treated in the therapeutic milieu with appropriate individual and group therapies.                   The day after admission, patient was noted as being seemingly at baseline, she was oriented, linear, and not expressing symptoms of psychosis.  This is in the context of receiving olanzapine as needed during the night for waking up being confused and expressing visual hallucinations.  After getting a good night sleep she was awake/alert as well as pleasant and cooperative during interview, expressing both interest and motivation for treatment while at the hospital.  She signed in voluntarily after being on a 72-hour hold.                 Anjali states that her mental health has been under control for quite some time on the current medication regimen.  She had no recent changes in medication or any substance use leading up to the recent episode of molina/psychosis.  She is states that an argument between her and her sister about her children and  triggered her, causing her a lot of stress that prevented her from being able to sleep.  The inability to sleep caused an episode consistent with her past episodes of molina which she referred to as \"tizzies.\"  Without being prompted she suggested that her medication to be " "adjusted.  Patient stated that she had a lot of success being treated with olanzapine as a monotherapy several years ago.  This was discontinued due to concern for weight gain though [facility], she has been receiving counseling on lifestyle changes including diet and exercise.  She feels that olanzapine would be the best choice of medication for her and will work on lifestyle changes to prevent or minimize possible metabolic side effects.  Her PTA aripiprazole was discontinued and she started on 10 mg of olanzapine nightly. Following this medication change, she remained pleasant, cooperative, and alert with resolution of symptoms that prompted her admission.  She did not express hallucinations or delusions after her first admission and getting her first dose of olanzapine as a as needed.  On 4/12 she felt ready for discharge, she had written out a plan of care and crisis plan prior to speaking with the physicians.  She had no noted side effects from the olanzapine, had improved and \"great\" sleep, while also feeling more confident in her ability to handle stressors.  Resident and patient practiced TIP P skills as well as reviewed safety plan.  She was discharged on 10 mg of olanzapine with anticipation that this may be titrated up, but also possibly discontinued after several months for a more weight neutral antipsychotic.  Patient had previous success on lurasidone (Latuda) without any noted side effects though her insurance did not cover this medication and it was discontinued, this was several years ago and insurance may cover this medication now as it is older.  Patient agreed to talk about this with her outpatient psychiatric provider as she is ready for discharge and wanting [facility].       Medical Course:   Anjali Coker was physically examined by the ED prior to being transferred to the unit and was found to be medically stable and appropriate for " admission.    Consults:   - None    Risk Assessment:   Today Anjali Coker reports doing well without racing thoughts or difficulty sleeping. In addition, she has notable risk factors for self-harm, including anxiety and psychosis. However, risk is mitigated by Anglican beliefs and sobriety. Additional steps taken to minimize risk include: discharge to [facility]. Therefore, based on all available evidence including the factors cited above, Anjali Coker does not appear to be an imminent danger to self or others and is appropriate for outpatient level of care. However, if patient uses substances or is non-adherent with medication, their risk of decompensation and SI/HI will be elevated. This was discussed with the patient.    This document serves as a transfer of care to Anjali Coker's outpatient providers.       Discharge Plan:     Patient is being discharged to home with the following medications and appointments as detailed below:    Medications:  Added/Changed:  - started olanzapine 10mg at bedtime  - decreased PTA atomoxetine from 80mg to 40mg QD    Continued:  - PTA Escitalopram 20 mg daily  - PTA melatonin 3 mg nightly  - PTA valacyclovir 500 mg daily    Discontinued:  - PTA aripiprazole 10mg daily    Patient discharge/disposition: Return to [facility]  Psychiatric Appointments: Patient will call to schedule outpatient follow-up with Dr. Goff  Psychotherapy Appointments: None  Other Referrals: None      Pt seen and discussed with my attending, MD Criselda Ramírez MD PGY1    35 minutes was used in interviewing and planning this discharge.     Attestation:   The patient has been seen and evaluated by me,  Theo Sheehan MD. I have examined the patient today and reviewed the discharge plan with the resident and medical student. I agree with the final assessment and plan, as noted in the discharge summary. I have reviewed today's vital signs, medications, labs and imaging.  Total  time discharge plannin minutes  Theo Sheehan MD ,Ph.D.           Appendix A: All Labs This Admission:     Results for orders placed or performed during the hospital encounter of 21   Drug abuse screen 6 urine (tox)     Status: None   Result Value Ref Range    Amphetamine Qual Urine Negative NEG^Negative    Barbiturates Qual Urine Negative NEG^Negative    Benzodiazepine Qual Urine Negative NEG^Negative    Cannabinoids Qual Urine Negative NEG^Negative    Cocaine Qual Urine Negative NEG^Negative    Ethanol Qual Urine Negative NEG^Negative    Opiates Qualitative Urine Negative NEG^Negative   HCG qualitative urine     Status: None   Result Value Ref Range    HCG Qual Urine Negative NEG^Negative   Asymptomatic Influenza A/B & SARS-CoV2 (COVID-19) Virus PCR Multiplex     Status: None    Specimen: Nasopharyngeal   Result Value Ref Range    Flu A/B & SARS-COV-2 PCR Source Nasopharyngeal     SARS-CoV-2 PCR Result NEGATIVE     Influenza A PCR Negative NEG^Negative    Influenza B PCR Negative NEG^Negative    Respiratory Syncytial Virus PCR (Note)     Flu A/B & SARS-CoV-2 PCR Comment (Note)    UA with Microscopic reflex to Culture     Status: Abnormal    Specimen: Midstream Urine   Result Value Ref Range    Color Urine Straw     Appearance Urine Clear     Glucose Urine Negative NEG^Negative mg/dL    Bilirubin Urine Negative NEG^Negative    Ketones Urine Negative NEG^Negative mg/dL    Specific Gravity Urine 1.007 1.003 - 1.035    Blood Urine Negative NEG^Negative    pH Urine 5.5 5.0 - 7.0 pH    Protein Albumin Urine Negative NEG^Negative mg/dL    Urobilinogen mg/dL Normal 0.0 - 2.0 mg/dL    Nitrite Urine Negative NEG^Negative    Leukocyte Esterase Urine Negative NEG^Negative    Source Midstream Urine     WBC Urine 1 0 - 5 /HPF    RBC Urine 1 0 - 2 /HPF    Bacteria Urine None (A) NEG^Negative /HPF    Squamous Epithelial /HPF Urine 1 0 - 1 /HPF    Mucous Urine Present (A) NEG^Negative /LPF   Comprehensive metabolic  panel     Status: Abnormal   Result Value Ref Range    Sodium 138 133 - 144 mmol/L    Potassium 3.9 3.4 - 5.3 mmol/L    Chloride 106 94 - 109 mmol/L    Carbon Dioxide 24 20 - 32 mmol/L    Anion Gap 8 3 - 14 mmol/L    Glucose 104 (H) 70 - 99 mg/dL    Urea Nitrogen 14 7 - 30 mg/dL    Creatinine 0.66 0.52 - 1.04 mg/dL    GFR Estimate >90 >60 mL/min/[1.73_m2]    GFR Estimate If Black >90 >60 mL/min/[1.73_m2]    Calcium 8.6 8.5 - 10.1 mg/dL    Bilirubin Total 0.5 0.2 - 1.3 mg/dL    Albumin 3.7 3.4 - 5.0 g/dL    Protein Total 7.3 6.8 - 8.8 g/dL    Alkaline Phosphatase 66 40 - 150 U/L    ALT 36 0 - 50 U/L    AST 14 0 - 45 U/L   TSH with free T4 reflex and/or T3 as indicated     Status: None   Result Value Ref Range    TSH 1.01 0.40 - 4.00 mU/L   Folate     Status: None   Result Value Ref Range    Folate 52.6 >5.4 ng/mL   Vitamin B12     Status: None   Result Value Ref Range    Vitamin B12 506 193 - 986 pg/mL   hCG qual urine POCT     Status: None   Result Value Ref Range    HCG Qual Urine Negative neg    Internal QC OK Yes

## 2021-04-19 NOTE — PROGRESS NOTES
Preceptor Attestation:   Patient seen, evaluated and discussed with the resident. I have verified the content of the note, which accurately reflects my assessment of the patient and the plan of care.   Supervising Physician:  Nikki Jansen, DO

## 2021-04-22 ENCOUNTER — TELEPHONE (OUTPATIENT)
Dept: SLEEP MEDICINE | Facility: CLINIC | Age: 41
End: 2021-04-22

## 2021-04-22 NOTE — TELEPHONE ENCOUNTER
Reason for call:  Other   Patient called regarding (reason for call): call back    Additional comments: per Antwon from [facility] . She called to schedule a sleep study for patient     Phone number to reach patient:  Home number on file [phone] (home)    Best Time:  Anytime     Can we leave a detailed message on this number?  NO    Travel screening: Not Applicable

## 2021-04-23 DIAGNOSIS — Z11.59 ENCOUNTER FOR SCREENING FOR OTHER VIRAL DISEASES: ICD-10-CM

## 2021-04-30 RX ORDER — ARIPIPRAZOLE 15 MG/1
15 TABLET ORAL DAILY
COMMUNITY
End: 2021-05-04

## 2021-05-03 DIAGNOSIS — Z11.59 ENCOUNTER FOR SCREENING FOR OTHER VIRAL DISEASES: ICD-10-CM

## 2021-05-03 LAB
LABORATORY COMMENT REPORT: NORMAL
SARS-COV-2 RNA RESP QL NAA+PROBE: NEGATIVE
SARS-COV-2 RNA RESP QL NAA+PROBE: NORMAL
SPECIMEN SOURCE: NORMAL
SPECIMEN SOURCE: NORMAL

## 2021-05-03 PROCEDURE — U0005 INFEC AGEN DETEC AMPLI PROBE: HCPCS | Performed by: OBSTETRICS & GYNECOLOGY

## 2021-05-03 PROCEDURE — U0003 INFECTIOUS AGENT DETECTION BY NUCLEIC ACID (DNA OR RNA); SEVERE ACUTE RESPIRATORY SYNDROME CORONAVIRUS 2 (SARS-COV-2) (CORONAVIRUS DISEASE [COVID-19]), AMPLIFIED PROBE TECHNIQUE, MAKING USE OF HIGH THROUGHPUT TECHNOLOGIES AS DESCRIBED BY CMS-2020-01-R: HCPCS | Performed by: OBSTETRICS & GYNECOLOGY

## 2021-05-04 ENCOUNTER — OFFICE VISIT (OUTPATIENT)
Dept: FAMILY MEDICINE | Facility: CLINIC | Age: 41
End: 2021-05-04
Payer: COMMERCIAL

## 2021-05-04 VITALS
TEMPERATURE: 98.1 F | WEIGHT: 270 LBS | DIASTOLIC BLOOD PRESSURE: 78 MMHG | BODY MASS INDEX: 40.92 KG/M2 | OXYGEN SATURATION: 97 % | RESPIRATION RATE: 18 BRPM | SYSTOLIC BLOOD PRESSURE: 112 MMHG | HEART RATE: 96 BPM | HEIGHT: 68 IN

## 2021-05-04 DIAGNOSIS — F31.9 BIPOLAR DISORDER WITH PSYCHOTIC FEATURES (H): ICD-10-CM

## 2021-05-04 DIAGNOSIS — E66.01 MORBID OBESITY (H): ICD-10-CM

## 2021-05-04 DIAGNOSIS — Z01.818 PREOPERATIVE EXAMINATION: Primary | ICD-10-CM

## 2021-05-04 LAB — HCG UR QL: NEGATIVE

## 2021-05-04 PROCEDURE — 81025 URINE PREGNANCY TEST: CPT | Performed by: STUDENT IN AN ORGANIZED HEALTH CARE EDUCATION/TRAINING PROGRAM

## 2021-05-04 PROCEDURE — 99214 OFFICE O/P EST MOD 30 MIN: CPT | Performed by: STUDENT IN AN ORGANIZED HEALTH CARE EDUCATION/TRAINING PROGRAM

## 2021-05-04 RX ORDER — LURASIDONE HYDROCHLORIDE 60 MG/1
1 TABLET, FILM COATED ORAL AT BEDTIME
COMMUNITY

## 2021-05-04 RX ORDER — OLANZAPINE 5 MG/1
5 TABLET ORAL AT BEDTIME
COMMUNITY

## 2021-05-04 ASSESSMENT — MIFFLIN-ST. JEOR: SCORE: 1948.08

## 2021-05-04 NOTE — PROGRESS NOTES
[facility]  [address]  [address] [place] [id number]  Phone: [phone]  Fax: [phone]  Primary Provider: Debra Beatty  Pre-op Performing Provider: JOVANNA DÍAZ    PREOPERATIVE EVALUATION:  Today's date: 5/4/2021    Anjali Coker is a 41 year old female who presents for a preoperative evaluation.    Surgical Information:  Surgery/Procedure: HYSTEROSCOPY, WITH DILATION AND CURETTAGE OF UTERUS, possibly USING MORCELLATOR  Surgery Location: [facility]  Surgeon: Dr. Steele  Surgery Date: 5/5/2021  Time of Surgery: 9:45 am   Where patient plans to recover: At home with family  Fax number for surgical facility: Note does not need to be faxed, will be available electronically in Epic.    Type of Anesthesia Anticipated: General    Assessment & Plan     The proposed surgical procedure is considered LOW risk.    Preoperative examination  - HCG Qualitative Urine (UPT) (Many's)    Bipolar disorder with psychotic features (H)  Stable, seeing psychiatry. Tapering down olanzapine and starting latuda.     Morbid obesity    Risks and Recommendations:  The patient has the following additional risks and recommendations for perioperative complications:   - No identified additional risk factors other than previously addressed    Medication Instructions:  Patient is to take all scheduled medications on the day of surgery    RECOMMENDATION:  APPROVAL GIVEN to proceed with proposed procedure, without further diagnostic evaluation.    Review of the result(s) of each unique test - 2    Subjective     HPI related to upcoming procedure:   IUD retained due to   CPE saw that IUD was out of place and then IUD removed- IUD was fractured. Partially embedded.  During pap   Periods very light since then  No pain   1-2 days a month of menses now (used to be 3-4 days)    Updated 05/04/21   1. Have you ever had a heart attack or stroke? No   2. Have you ever had surgery on your heart or blood vessels,  "such as a stent placement, a coronary artery bypass, or surgery on an artery in your head, neck, heart, or legs? No   3. Do you have chest pain with activity? No   4. Do you have a history of  heart failure? No   5. Do you currently have a cold, bronchitis or symptoms of other infection? No   6. Do you have a cough, shortness of breath, or wheezing? No   7. Do you or anyone in your family have previous history of blood clots? No   8. Do you or does anyone in your family have a serious bleeding problem such as prolonged bleeding following surgeries or cuts? No   9. Have you ever had problems with anemia or been told to take iron pills? YES - during her first pregancy   10. Have you had any abnormal blood loss such as black, tarry or bloody stools, or abnormal vaginal bleeding? No   11. Have you ever had a blood transfusion? No   12. Are you willing to have a blood transfusion if it is medically needed before, during, or after your surgery? Yes   13. Have you or any of your relatives ever had problems with anesthesia? No   14. Do you have sleep apnea, excessive snoring or daytime drowsiness? YES - sleep apnea, going to a sleep study. Not done yet but will be planning.   14a. Do you have a CPAP machine? No   15. Do you have any artifical heart valves or other implanted medical devices like a pacemaker, defibrillator, or continuous glucose monitor? No   16. Do you have artificial joints? No   17. Are you allergic to latex? No   18. Is there any chance that you may be pregnant? No - last time she had sex was last Thursday - used protection she thinks but says \"maybe\"     New questionnaire from today as above.     Health Care Directive:  Patient does not have a Health Care Directive or Living Will: Discussed advance care planning with patient; however, patient declined at this time.    Preoperative Review of :   reviewed - no record of controlled substances prescribed.    Status of Chronic Conditions:  See problem " list for active medical problems.  Problems all longstanding and stable, except as noted/documented.  See ROS for pertinent symptoms related to these conditions.    Review of Systems  Constitutional, neuro, ENT, endocrine, pulmonary, cardiac, gastrointestinal, genitourinary, musculoskeletal, integument and psychiatric systems are negative, except as otherwise noted.    Patient Active Problem List    Diagnosis Date Noted     Anxiety 2021     Priority: Medium     Bipolar disorder with psychotic features (H) 2021     Priority: Medium     Retained intrauterine contraceptive device (IUD) 2021     Priority: Medium     Added automatically from request for surgery [id number]       Chlamydia infection 02/10/2021     Priority: Medium     Polysubstance abuse (H) 02/10/2021     Priority: Medium     Other abnormal cytological finding of specimen from cervix 02/10/2021     Priority: Medium     History of HPV infection 02/10/2021     Priority: Medium      Past Medical History:   Diagnosis Date     Bipolar disorder (H)      Depressive disorder      Herpes 2000     Past Surgical History:   Procedure Laterality Date     sponaneous    ,      Current Outpatient Medications   Medication Sig Dispense Refill     acetaminophen (TYLENOL) 500 MG tablet Take 500-1,000 mg by mouth every 6 hours as needed for mild pain       atomoxetine (STRATTERA) 40 MG capsule Take 1 capsule (40 mg) by mouth daily 30 capsule 0     escitalopram (LEXAPRO) 20 MG tablet Take 1 tablet (20 mg) by mouth daily       ibuprofen (ADVIL/MOTRIN) 200 MG tablet Take 200 mg by mouth every 4 hours as needed for mild pain       melatonin 3 MG tablet Take 1 tablet (3 mg) by mouth nightly as needed for sleep 14 tablet 0     OLANZapine (ZYPREXA) 10 MG tablet Take 1 tablet (10 mg) by mouth At Bedtime 30 tablet 0     valACYclovir (VALTREX) 500 MG tablet Take 500 mg by mouth every evening        ARIPiprazole (ABILIFY) 15 MG tablet Take 15 mg by mouth  "daily       Allergies   Allergen Reactions     Vistaril [Hydroxyzine]       Social History     Tobacco Use     Smoking status: Former Smoker     Packs/day: 1.00     Years: 20.00     Pack years: 20.00     Types: Cigarettes     Start date: 3/14/1996     Quit date: 2020     Years since quittin.3     Smokeless tobacco: Never Used   Substance Use Topics     Alcohol use: Not Currently     Comment: sober since 2020     No pertinent family hx.   History   Drug Use Unknown     Comment: sober since 2020        Objective     /78   Pulse 96   Temp 98.1  F (36.7  C) (Oral)   Resp 18   Ht 1.727 m (5' 7.99\")   Wt 122.5 kg (270 lb)   LMP 2021 (Approximate)   SpO2 97%   BMI 41.06 kg/m      Physical Exam    GENERAL APPEARANCE: alert and no distress     EYES: EOMI     HENT: nose and mouth without ulcers or lesions     NECK: no adenopathy, no asymmetry, masses, or scars     RESP: lungs clear to auscultation - no rales, rhonchi or wheezes     CV: regular rates and rhythm, normal S1 S2, no S3 or S4 and no murmur, click or rub     ABDOMEN:  soft, nontender, no HSM or masses     MS: extremities normal- no gross deformities noted, no evidence of inflammation in joints, FROM in all extremities.     SKIN: no suspicious lesions or rashes on visible skin     NEURO: Normal strength and tone, sensory exam grossly normal, mentation intact and speech normal     PSYCH: mentation appears normal. and affect normal/bright     LYMPHATICS: No cervical adenopathy    Recent Labs   Lab Test 21  0751 21  0915   HGB  --  13.1   PLT  --  224     --    POTASSIUM 3.9  --    CR 0.66  --       Diagnostics:  I reviewed 21 CBC that was totally normal. I reviewed covid test done yesterday - negative.   No EKG required for low risk surgery (cataract, skin procedure, breast biopsy, etc).    Revised Cardiac Risk Index (RCRI):  The patient has the following serious cardiovascular risks for perioperative " complications:   - No serious cardiac risks = 0 points     RCRI Interpretation: 0 points: Class I (very low risk - 0.4% complication rate)     Signed Electronically by: Olivier Felipe MD  Copy of this evaluation report is provided to requesting physician.      Hospitalized 4/8- 4/12 for psychosis and molina   I reviewed hospital discharge summary   She is states that an argument between her and her sister about her children and  triggered her  Her PTA aripiprazole was discontinued and she started on 10 mg of olanzapine nightly. Did this for one month or so.   Now going off olanzapine and going onto Latuda with her psychiatrist through [facility]. Was on latuda years ago and worked well

## 2021-05-04 NOTE — PROGRESS NOTES
Preceptor Attestation:   Patient seen, evaluated and discussed with the resident. I have verified the content of the note, which accurately reflects my assessment of the patient and the plan of care.   Supervising Physician:  Jose Martin Lacy MD

## 2021-05-04 NOTE — PATIENT INSTRUCTIONS
Come back for next Hepatitis B shot or go see if your OBGYN can do it at your post op check       Patient Education   Here is the plan from today's visit    1. Preoperative examination  Good luck with your procedure tomorrow!  We will let you know if your pregnancy test is positive. Otherwise you won't hear from us if it's negative (which is what I'm expecting)   - HCG Qualitative Urine (UPT) ([facility])    2. Bipolar disorder with psychotic features (H)  Continue seeing your psychiatrist     Please call or return to clinic if your symptoms don't go away.    Follow up plan  Return in about 3 weeks (around 5/25/2021) for in person visit.    Thank you for coming to [facility] today.  Lab Testing:  **If you had lab testing today and your results are reassuring or normal they will be mailed to you or sent through [email or web address] within 7 days.   **If the lab tests need quick action we will call you with the results.  **If you are having labs done on a different day, please call [phone] to schedule at [facility] or [phone] for other [facility] locations.   The phone number we will call with results is # [phone] (home) . If this is not the best number please call our clinic and change the number.  Medication Refills:  If you need any refills please call your pharmacy and they will contact us.   If you need to  your refill at a new pharmacy, please contact the new pharmacy directly. The new pharmacy will help you get your medications transferred faster.   Scheduling:  If you have any concerns about today's visit or wish to schedule another appointment please call our office during normal business hours [phone] (8-5:00 M-F)  If a referral was made to a [facility] and you don't get a call from central scheduling please call [phone].  If a Mammogram was ordered for you at [facility] call [phone] to schedule or change your appointment.  If you had  an EKG/XRay/CT/Ultrasound/MRI ordered the number is [phone] to schedule or change your radiology appointment.   Medical Concerns:  If you have urgent medical concerns please call [phone] at any time of the day.    Olivier Felipe MD

## 2021-05-05 ENCOUNTER — HOSPITAL ENCOUNTER (OUTPATIENT)
Facility: CLINIC | Age: 41
Discharge: NURSING, REHAB OR LONG-TERM CARE | End: 2021-05-05
Attending: OBSTETRICS & GYNECOLOGY | Admitting: OBSTETRICS & GYNECOLOGY
Payer: COMMERCIAL

## 2021-05-05 ENCOUNTER — ANESTHESIA (OUTPATIENT)
Dept: SURGERY | Facility: CLINIC | Age: 41
End: 2021-05-05
Payer: COMMERCIAL

## 2021-05-05 ENCOUNTER — ANESTHESIA EVENT (OUTPATIENT)
Dept: SURGERY | Facility: CLINIC | Age: 41
End: 2021-05-05
Payer: COMMERCIAL

## 2021-05-05 VITALS
TEMPERATURE: 97.7 F | OXYGEN SATURATION: 98 % | WEIGHT: 285.72 LBS | RESPIRATION RATE: 18 BRPM | BODY MASS INDEX: 43.3 KG/M2 | SYSTOLIC BLOOD PRESSURE: 120 MMHG | HEIGHT: 68 IN | HEART RATE: 91 BPM | DIASTOLIC BLOOD PRESSURE: 68 MMHG

## 2021-05-05 DIAGNOSIS — T83.39XA RETAINED INTRAUTERINE CONTRACEPTIVE DEVICE (IUD): ICD-10-CM

## 2021-05-05 LAB
GLUCOSE BLDC GLUCOMTR-MCNC: 98 MG/DL (ref 70–99)
HCG UR QL: NEGATIVE
HGB BLD-MCNC: 13.3 G/DL (ref 11.7–15.7)

## 2021-05-05 PROCEDURE — 250N000011 HC RX IP 250 OP 636: Performed by: NURSE ANESTHETIST, CERTIFIED REGISTERED

## 2021-05-05 PROCEDURE — 360N000076 HC SURGERY LEVEL 3, PER MIN: Performed by: OBSTETRICS & GYNECOLOGY

## 2021-05-05 PROCEDURE — 370N000017 HC ANESTHESIA TECHNICAL FEE, PER MIN: Performed by: OBSTETRICS & GYNECOLOGY

## 2021-05-05 PROCEDURE — 85018 HEMOGLOBIN: CPT | Performed by: OBSTETRICS & GYNECOLOGY

## 2021-05-05 PROCEDURE — 999N000141 HC STATISTIC PRE-PROCEDURE NURSING ASSESSMENT: Performed by: OBSTETRICS & GYNECOLOGY

## 2021-05-05 PROCEDURE — 81025 URINE PREGNANCY TEST: CPT | Performed by: OBSTETRICS & GYNECOLOGY

## 2021-05-05 PROCEDURE — 272N000001 HC OR GENERAL SUPPLY STERILE: Performed by: OBSTETRICS & GYNECOLOGY

## 2021-05-05 PROCEDURE — 710N000012 HC RECOVERY PHASE 2, PER MINUTE: Performed by: OBSTETRICS & GYNECOLOGY

## 2021-05-05 PROCEDURE — 82962 GLUCOSE BLOOD TEST: CPT

## 2021-05-05 PROCEDURE — 258N000003 HC RX IP 258 OP 636: Performed by: NURSE ANESTHETIST, CERTIFIED REGISTERED

## 2021-05-05 PROCEDURE — 250N000013 HC RX MED GY IP 250 OP 250 PS 637: Performed by: OBSTETRICS & GYNECOLOGY

## 2021-05-05 PROCEDURE — 250N000009 HC RX 250: Performed by: OBSTETRICS & GYNECOLOGY

## 2021-05-05 PROCEDURE — 250N000009 HC RX 250: Performed by: NURSE ANESTHETIST, CERTIFIED REGISTERED

## 2021-05-05 RX ORDER — ACETAMINOPHEN 325 MG/1
975 TABLET ORAL EVERY 6 HOURS PRN
Qty: 50 TABLET | Refills: 0 | Status: SHIPPED | OUTPATIENT
Start: 2021-05-05 | End: 2021-05-05

## 2021-05-05 RX ORDER — NALOXONE HYDROCHLORIDE 0.4 MG/ML
0.2 INJECTION, SOLUTION INTRAMUSCULAR; INTRAVENOUS; SUBCUTANEOUS
Status: DISCONTINUED | OUTPATIENT
Start: 2021-05-05 | End: 2021-05-05 | Stop reason: HOSPADM

## 2021-05-05 RX ORDER — IBUPROFEN 800 MG/1
800 TABLET, FILM COATED ORAL EVERY 6 HOURS PRN
Qty: 30 TABLET | Refills: 0 | Status: SHIPPED | OUTPATIENT
Start: 2021-05-05 | End: 2021-05-05

## 2021-05-05 RX ORDER — OXYCODONE HYDROCHLORIDE 5 MG/1
5 TABLET ORAL EVERY 4 HOURS PRN
Status: DISCONTINUED | OUTPATIENT
Start: 2021-05-05 | End: 2021-05-05 | Stop reason: HOSPADM

## 2021-05-05 RX ORDER — FENTANYL CITRATE 50 UG/ML
25-50 INJECTION, SOLUTION INTRAMUSCULAR; INTRAVENOUS
Status: DISCONTINUED | OUTPATIENT
Start: 2021-05-05 | End: 2021-05-05 | Stop reason: HOSPADM

## 2021-05-05 RX ORDER — IBUPROFEN 800 MG/1
800 TABLET, FILM COATED ORAL EVERY 6 HOURS PRN
Qty: 30 TABLET | Refills: 0 | Status: SHIPPED | OUTPATIENT
Start: 2021-05-05 | End: 2021-05-21

## 2021-05-05 RX ORDER — SODIUM CHLORIDE, SODIUM LACTATE, POTASSIUM CHLORIDE, CALCIUM CHLORIDE 600; 310; 30; 20 MG/100ML; MG/100ML; MG/100ML; MG/100ML
INJECTION, SOLUTION INTRAVENOUS CONTINUOUS
Status: DISCONTINUED | OUTPATIENT
Start: 2021-05-05 | End: 2021-05-05 | Stop reason: HOSPADM

## 2021-05-05 RX ORDER — KETOROLAC TROMETHAMINE 30 MG/ML
INJECTION, SOLUTION INTRAMUSCULAR; INTRAVENOUS PRN
Status: DISCONTINUED | OUTPATIENT
Start: 2021-05-05 | End: 2021-05-05

## 2021-05-05 RX ORDER — LIDOCAINE HYDROCHLORIDE 20 MG/ML
INJECTION, SOLUTION INFILTRATION; PERINEURAL PRN
Status: DISCONTINUED | OUTPATIENT
Start: 2021-05-05 | End: 2021-05-05

## 2021-05-05 RX ORDER — FENTANYL CITRATE 50 UG/ML
INJECTION, SOLUTION INTRAMUSCULAR; INTRAVENOUS PRN
Status: DISCONTINUED | OUTPATIENT
Start: 2021-05-05 | End: 2021-05-05

## 2021-05-05 RX ORDER — ONDANSETRON 2 MG/ML
INJECTION INTRAMUSCULAR; INTRAVENOUS PRN
Status: DISCONTINUED | OUTPATIENT
Start: 2021-05-05 | End: 2021-05-05

## 2021-05-05 RX ORDER — ONDANSETRON 2 MG/ML
4 INJECTION INTRAMUSCULAR; INTRAVENOUS EVERY 30 MIN PRN
Status: DISCONTINUED | OUTPATIENT
Start: 2021-05-05 | End: 2021-05-05 | Stop reason: HOSPADM

## 2021-05-05 RX ORDER — ACETAMINOPHEN 325 MG/1
975 TABLET ORAL ONCE
Status: COMPLETED | OUTPATIENT
Start: 2021-05-05 | End: 2021-05-05

## 2021-05-05 RX ORDER — PROPOFOL 10 MG/ML
INJECTION, EMULSION INTRAVENOUS CONTINUOUS PRN
Status: DISCONTINUED | OUTPATIENT
Start: 2021-05-05 | End: 2021-05-05

## 2021-05-05 RX ORDER — LIDOCAINE HYDROCHLORIDE AND EPINEPHRINE 10; 10 MG/ML; UG/ML
INJECTION, SOLUTION INFILTRATION; PERINEURAL PRN
Status: DISCONTINUED | OUTPATIENT
Start: 2021-05-05 | End: 2021-05-05 | Stop reason: HOSPADM

## 2021-05-05 RX ORDER — DEXAMETHASONE SODIUM PHOSPHATE 4 MG/ML
INJECTION, SOLUTION INTRA-ARTICULAR; INTRALESIONAL; INTRAMUSCULAR; INTRAVENOUS; SOFT TISSUE PRN
Status: DISCONTINUED | OUTPATIENT
Start: 2021-05-05 | End: 2021-05-05

## 2021-05-05 RX ORDER — SODIUM CHLORIDE, SODIUM LACTATE, POTASSIUM CHLORIDE, CALCIUM CHLORIDE 600; 310; 30; 20 MG/100ML; MG/100ML; MG/100ML; MG/100ML
INJECTION, SOLUTION INTRAVENOUS CONTINUOUS PRN
Status: DISCONTINUED | OUTPATIENT
Start: 2021-05-05 | End: 2021-05-05

## 2021-05-05 RX ORDER — ONDANSETRON 4 MG/1
4 TABLET, ORALLY DISINTEGRATING ORAL EVERY 30 MIN PRN
Status: DISCONTINUED | OUTPATIENT
Start: 2021-05-05 | End: 2021-05-05 | Stop reason: HOSPADM

## 2021-05-05 RX ORDER — LIDOCAINE 40 MG/G
CREAM TOPICAL
Status: DISCONTINUED | OUTPATIENT
Start: 2021-05-05 | End: 2021-05-05 | Stop reason: HOSPADM

## 2021-05-05 RX ORDER — PROPOFOL 10 MG/ML
INJECTION, EMULSION INTRAVENOUS PRN
Status: DISCONTINUED | OUTPATIENT
Start: 2021-05-05 | End: 2021-05-05

## 2021-05-05 RX ORDER — NALOXONE HYDROCHLORIDE 0.4 MG/ML
0.4 INJECTION, SOLUTION INTRAMUSCULAR; INTRAVENOUS; SUBCUTANEOUS
Status: DISCONTINUED | OUTPATIENT
Start: 2021-05-05 | End: 2021-05-05 | Stop reason: HOSPADM

## 2021-05-05 RX ORDER — IBUPROFEN 200 MG
800 TABLET ORAL ONCE
Status: DISCONTINUED | OUTPATIENT
Start: 2021-05-05 | End: 2021-05-05 | Stop reason: HOSPADM

## 2021-05-05 RX ORDER — ACETAMINOPHEN 325 MG/1
975 TABLET ORAL EVERY 6 HOURS PRN
Qty: 50 TABLET | Refills: 0 | Status: SHIPPED | OUTPATIENT
Start: 2021-05-05 | End: 2021-05-21

## 2021-05-05 RX ORDER — HYDROMORPHONE HYDROCHLORIDE 1 MG/ML
.3-.5 INJECTION, SOLUTION INTRAMUSCULAR; INTRAVENOUS; SUBCUTANEOUS EVERY 10 MIN PRN
Status: DISCONTINUED | OUTPATIENT
Start: 2021-05-05 | End: 2021-05-05 | Stop reason: HOSPADM

## 2021-05-05 RX ORDER — ACETAMINOPHEN 325 MG/1
975 TABLET ORAL ONCE
Status: DISCONTINUED | OUTPATIENT
Start: 2021-05-05 | End: 2021-05-05 | Stop reason: HOSPADM

## 2021-05-05 RX ORDER — OXYCODONE HYDROCHLORIDE 5 MG/1
5 TABLET ORAL
Status: DISCONTINUED | OUTPATIENT
Start: 2021-05-05 | End: 2021-05-05 | Stop reason: HOSPADM

## 2021-05-05 RX ORDER — MEPERIDINE HYDROCHLORIDE 25 MG/ML
12.5 INJECTION INTRAMUSCULAR; INTRAVENOUS; SUBCUTANEOUS
Status: DISCONTINUED | OUTPATIENT
Start: 2021-05-05 | End: 2021-05-05 | Stop reason: HOSPADM

## 2021-05-05 RX ADMIN — DEXAMETHASONE SODIUM PHOSPHATE 6 MG: 4 INJECTION, SOLUTION INTRAMUSCULAR; INTRAVENOUS at 10:42

## 2021-05-05 RX ADMIN — MIDAZOLAM 2 MG: 1 INJECTION INTRAMUSCULAR; INTRAVENOUS at 10:09

## 2021-05-05 RX ADMIN — ACETAMINOPHEN 975 MG: 325 TABLET, FILM COATED ORAL at 09:05

## 2021-05-05 RX ADMIN — PROPOFOL 90 MG: 10 INJECTION, EMULSION INTRAVENOUS at 10:16

## 2021-05-05 RX ADMIN — FENTANYL CITRATE 50 MCG: 50 INJECTION, SOLUTION INTRAMUSCULAR; INTRAVENOUS at 10:24

## 2021-05-05 RX ADMIN — ONDANSETRON 4 MG: 2 INJECTION INTRAMUSCULAR; INTRAVENOUS at 10:42

## 2021-05-05 RX ADMIN — PROPOFOL 30 MG: 10 INJECTION, EMULSION INTRAVENOUS at 10:17

## 2021-05-05 RX ADMIN — PROPOFOL 30 MG: 10 INJECTION, EMULSION INTRAVENOUS at 10:26

## 2021-05-05 RX ADMIN — LIDOCAINE HYDROCHLORIDE 60 MG: 20 INJECTION, SOLUTION INFILTRATION; PERINEURAL at 10:15

## 2021-05-05 RX ADMIN — PROPOFOL 20 MG: 10 INJECTION, EMULSION INTRAVENOUS at 10:24

## 2021-05-05 RX ADMIN — KETOROLAC TROMETHAMINE 30 MG: 30 INJECTION, SOLUTION INTRAMUSCULAR at 10:42

## 2021-05-05 RX ADMIN — SODIUM CHLORIDE, SODIUM LACTATE, POTASSIUM CHLORIDE, CALCIUM CHLORIDE: 600; 310; 30; 20 INJECTION, SOLUTION INTRAVENOUS at 10:11

## 2021-05-05 RX ADMIN — PROPOFOL 50 MCG/KG/MIN: 10 INJECTION, EMULSION INTRAVENOUS at 10:15

## 2021-05-05 ASSESSMENT — MIFFLIN-ST. JEOR: SCORE: 2019.37

## 2021-05-05 NOTE — ANESTHESIA CARE TRANSFER NOTE
Patient: Anjali Coker    Procedure(s):  HYSTEROSCOPY, Removal Of IUD, Exam Under Anesthesia  REMOVAL, INTRAUTERINE DEVICE    Diagnosis: Retained intrauterine contraceptive device (IUD) [T83.39XA]  Diagnosis Additional Information: No value filed.    Anesthesia Type:   MAC     Note:    Oropharynx: oropharynx clear of all foreign objects and spontaneously breathing  Level of Consciousness: awake  Oxygen Supplementation: face mask  Level of Supplemental Oxygen (L/min / FiO2): 5  Independent Airway: airway patency satisfactory and stable  Dentition: dentition unchanged  Vital Signs Stable: post-procedure vital signs reviewed and stable  Report to RN Given: handoff report given  Patient transferred to: Phase II  Comments: 109/69, HR 83, sat 99%, RR 16, T 36.5  Handoff Report: Identifed the Patient, Identified the Reponsible Provider, Reviewed the pertinent medical history, Discussed the surgical course, Reviewed Intra-OP anesthesia mangement and issues during anesthesia, Set expectations for post-procedure period and Allowed opportunity for questions and acknowledgement of understanding      Vitals: (Last set prior to Anesthesia Care Transfer)  CRNA VITALS  5/5/2021 1018 - 5/5/2021 1054      5/5/2021             Pulse:  83    SpO2:  99 %    Resp Rate (observed):  (!) 1        Electronically Signed By: SJ Mccann CRNA  May 5, 2021  10:54 AM

## 2021-05-05 NOTE — ANESTHESIA POSTPROCEDURE EVALUATION
Patient: Anjali Coker    Procedure(s):  HYSTEROSCOPY, Removal Of IUD, Exam Under Anesthesia  REMOVAL, INTRAUTERINE DEVICE    Diagnosis:Retained intrauterine contraceptive device (IUD) [T83.39XA]  Diagnosis Additional Information: No value filed.    Anesthesia Type:  MAC    Note:  Disposition: Outpatient   Postop Pain Control: Uneventful            Sign Out: Well controlled pain   PONV:    Neuro/Psych: Uneventful            Sign Out: Acceptable/Baseline neuro status   Airway/Respiratory: Uneventful            Sign Out: Acceptable/Baseline resp. status   CV/Hemodynamics: Uneventful            Sign Out: Acceptable CV status; No obvious hypovolemia; No obvious fluid overload   Other NRE:    DID A NON-ROUTINE EVENT OCCUR?            Last vitals:  Vitals:    05/05/21 0824 05/05/21 1051 05/05/21 1100   BP: 118/80 109/69 120/68   Pulse: 83 87 91   Resp: 18 18 18   Temp: 36.9  C (98.4  F) 36.5  C (97.7  F)    SpO2: 97% 98% 98%       Last vitals prior to Anesthesia Care Transfer:  CRNA VITALS  5/5/2021 1018 - 5/5/2021 1118      5/5/2021             Pulse:  83    SpO2:  99 %    Resp Rate (observed):  (!) 1          Electronically Signed By: Jeannie Zee MD  May 5, 2021  11:24 AM

## 2021-05-05 NOTE — OR NURSING
Adela RN called and discussed and reviewed discharge instructions.  Adela verbalized understanding.  Patient to be discharged and driven home with  and then to go back to [facility].

## 2021-05-05 NOTE — OR NURSING
Adela RN at [facility] spoken to and plan is for patient's  to bring patient back to facility after procedure.  When patient is ready for discharge we are to call with discharge instructions and to fill out supplied paperwork and send AVS with patient.  Adela also requested prescriptions/or filled rx for patient to bring back to facility to have a supply for patient.

## 2021-05-05 NOTE — OP NOTE
Hysteroscopy Operative Report  Date of Procedure: 5/5/2021  Patient: Anjali Coker  Preoperative diagnosis:   1. Retained IUD Fragment   Postoperative diagnosis:   1. Retained IUD Fragment, removed  Surgeon: Dr. Chelsey Steele  Procedure: Operative Hysteroscopy, Dilation and Curettage  Anesthesia: MAC    Findings:   Bimanual revealed small uterus and normal adnexae; no IUD fragment was palpable at the cervix.  Hysteroscopy revealed an IUD fragment in the cervical canal that was easily removed with hysteroscopic graspers.      Description of Operation:  The patient was taken to the operating room and placed on the OR table. Monitored anesthesia care was administered and the patient was placed in the dorsal lithotomy position. Exam under anesthesia revealed the above findings. The patient was prepped and draped in the usual sterile fashion. A bivalve speculum was placed into the vagina. The anterior lip of the cervix was grasped with a single-tooth tenaculum. An intracervical block was performed with 10mL of 1% lidocaine with epinephrine. The cervix was dilated to 19F, and a 0 degree 6mm hysteroscope was inserted into the cervical canal using 0.9% normal saline for distention. The IUD fragment was then easily identified perpendicularly in the cervical canal, partially embedded in the cervix. It was grasped with the hysteroscopic graspers and removed in one piece. The hysteroscope was reinserted and the cervical canal was hemostatic. The hysteroscope was then removed. The tenaculum was removed from the cervix. Hemostasis was noted. The speculum was removed from the vagina. The patient was awoken from anesthesia and taken to the recovery room.    All counts were reported as being correct.     Specimens: none  EBL: 5mL   IV Fluids: please see anesthesia record  Urine Output: none  Fluid Deficit: 135mL  Complications: none  Condition: stable  Disposition: To PACU    Chelsey Steele MD, MSCI  5/5/2021 2:37 PM

## 2021-05-05 NOTE — OR NURSING
Pharmacy called, they said their system was down and they didn't have Anjali's prescriptions filled. Adela RN called and said she could administer tylenol and ibuprofen if Anjali was sent with paper scripts. MD Arguelles wrote paper scripts and signed. Anjali discharged with paper scripts

## 2021-05-05 NOTE — ANESTHESIA PREPROCEDURE EVALUATION
Anesthesia Pre-Procedure Evaluation    Patient: Anjali Coker   MRN: [id number] : 1980        Preoperative Diagnosis: Retained intrauterine contraceptive device (IUD) [T83.39XA]   Procedure : Procedure(s):  HYSTEROSCOPY, WITH DILATION AND CURETTAGE OF UTERUS, possibly USING MORCELLATOR,  REMOVAL, INTRAUTERINE DEVICE     Past Medical History:   Diagnosis Date     Bipolar disorder (H)      Depressive disorder      Herpes 2000      Past Surgical History:   Procedure Laterality Date     sponaneous    ,       Allergies   Allergen Reactions     Vistaril [Hydroxyzine]       Social History     Tobacco Use     Smoking status: Former Smoker     Packs/day: 1.00     Years: 20.00     Pack years: 20.00     Types: Cigarettes     Start date: 3/14/1996     Quit date: 2020     Years since quittin.3     Smokeless tobacco: Never Used   Substance Use Topics     Alcohol use: Not Currently     Comment: sober since 2020      Wt Readings from Last 1 Encounters:   21 129.6 kg (285 lb 11.5 oz)        Anesthesia Evaluation   Pt has had prior anesthetic. Type: MAC.    No history of anesthetic complications       ROS/MED HX  ENT/Pulmonary:     (+) ROMANA risk factors, obese,     Neurologic:       Cardiovascular:  - neg cardiovascular ROS     METS/Exercise Tolerance:     Hematologic:  - neg hematologic  ROS     Musculoskeletal:  - neg musculoskeletal ROS     GI/Hepatic:  - neg GI/hepatic ROS     Renal/Genitourinary:  - neg Renal ROS     Endo:     (+) Obesity,     Psychiatric/Substance Use:     (+) psychiatric history bipolar, depression and anxiety : Hx/o PSA.    Infectious Disease:  - neg infectious disease ROS     Malignancy:  - neg malignancy ROS     Other:            Physical Exam    Airway        Mallampati: III   TM distance: > 3 FB   Neck ROM: full   Mouth opening: > 3 cm    Respiratory Devices and Support         Dental  no notable dental history         Cardiovascular   cardiovascular exam normal        Rhythm and rate: regular and normal     Pulmonary   pulmonary exam normal                OUTSIDE LABS:  CBC:   Lab Results   Component Value Date    WBC 7.2 04/07/2021    HGB 13.1 04/07/2021    HCT 40.7 04/07/2021     04/07/2021     BMP:   Lab Results   Component Value Date     04/09/2021    POTASSIUM 3.9 04/09/2021    CHLORIDE 106 04/09/2021    CO2 24 04/09/2021    BUN 14 04/09/2021    CR 0.66 04/09/2021     (H) 04/09/2021     COAGS: No results found for: PTT, INR, FIBR  POC:   Lab Results   Component Value Date    HCG Negative 05/05/2021     HEPATIC:   Lab Results   Component Value Date    ALBUMIN 3.7 04/09/2021    PROTTOTAL 7.3 04/09/2021    ALT 36 04/09/2021    AST 14 04/09/2021    ALKPHOS 66 04/09/2021    BILITOTAL 0.5 04/09/2021     OTHER:   Lab Results   Component Value Date    ELENA 8.6 04/09/2021    TSH 1.01 04/09/2021       Anesthesia Plan    ASA Status:  3   NPO Status:  NPO Appropriate    Anesthesia Type: MAC.     - Reason for MAC: straight local not clinically adequate   Induction: Propofol.   Maintenance: TIVA.        Consents    Anesthesia Plan(s) and associated risks, benefits, and realistic alternatives discussed. Questions answered and patient/representative(s) expressed understanding.     - Discussed with:  Patient      - Extended Intubation/Ventilatory Support Discussed: No.      - Patient is DNR/DNI Status: No    Use of blood products discussed: No .     Postoperative Care    Pain management: IV analgesics, Oral pain medications.   PONV prophylaxis: Ondansetron (or other 5HT-3), Background Propofol Infusion     Comments:    MAC, sedation, GA as back up, standard ASA monitors  All pertinent results and records reviewed, risks, included but not limited to hypoventilation, hypoxemia, laryngo/bronchospasm, N/V, intraoperative awareness due to sedation only d/w patient, all questions, concerns addressed            Jeannie Zee MD

## 2021-05-07 NOTE — TELEPHONE ENCOUNTER
Reason for call:  Other   Patient called regarding (reason for call): call back  Additional comments: [facility] is calling to schedule a sleep study for this patient. Please call [facility] back to schedule.    Phone number to reach patient:  Other phone number:  [phone] [facility]    Best Time:  Any time    Can we leave a detailed message on this number?  YES    Travel screening: Not Applicable

## 2021-05-10 DIAGNOSIS — F31.9 BIPOLAR DISORDER WITH PSYCHOTIC FEATURES (H): ICD-10-CM

## 2021-05-12 RX ORDER — LANOLIN ALCOHOL/MO/W.PET/CERES
CREAM (GRAM) TOPICAL
Qty: 14 TABLET | Refills: 0 | OUTPATIENT
Start: 2021-05-12

## 2021-05-12 NOTE — TELEPHONE ENCOUNTER
Medication requested: Melatonin 3MG TABS   Last refilled: 4/11/2021  Qty: 14/0      Last seen: Inpatient only Discharged 4/12/2021/ 72 hour hold.   Patient discharge/disposition: Return to [facility]  Psychiatric Appointments: Patient will call to schedule outpatient follow-up with Dr. Goff  Psychotherapy Appointments: None  Other Referrals: None  Refill decision:   Denied.

## 2021-05-12 NOTE — TELEPHONE ENCOUNTER
Left a message for patient to contact sleep clinic to schedule a sleep study.     Cinda Garza MA

## 2021-05-18 NOTE — PROGRESS NOTES
"[facility]  Gynecology Postoperative Visit    SUBJECTIVE    Anjali Coker is a 41 year old  who is here for a postoperative visit. She underwent a hysteroscopy for removal of a retained IUD fragment on 21. Since then, she notes she is doing well and has no concerns. She would like to discuss contraception, but does not want another IUD.    Her LMP is: Patient's last menstrual period was 2021 (exact date).      REVIEW OF SYSTEMS  A 10 point review of systems including Constitutional, Eyes, Respiratory, Cardiovascular, Gastroenterology, Genitourinary, Integumentary, Musculoskeletal, and Psychiatric, were all negative, except for pertinent positives noted in the above HPI.    OBJECTIVE  /77 (BP Location: Left arm, Patient Position: Chair)   Pulse 87   Ht 1.702 m (5' 7\")   Wt 132.5 kg (292 lb)   LMP 2021 (Approximate)   BMI 45.73 kg/m      General: Alert, without distress  HEENT: normocephalic, without obvious abnormality   Extremities: normal    ASSESSMENT  Anjali Coker is a 41 year old  who is here for a postoperative visit. She is 2 weeks after a hysteroscopy to remove a retained IUD fragment.    PLAN  -Anjali desires to use oral contraceptive pills, as she uses many other medications by mouth and manages them easily. She does not smoke, have a history of blood clots, have high blood pressure, or migraines with aura, and so is at baseline risk for blood clots in the legs (DVT). We discussed signs of blood clot including pain and swelling in the leg, especially if only one side. She may start the pill as soon as she gets if from the pharmacy. We discussed the need for backup method (condoms) x 1 week upon starting until the method is effective. Prescription sent to preferred pharmacy, along with requested prescription for a prenatal vitamin in order to help with hair and nail growth.  -paperwork for [facility] filled out upon end of clinical " encounter.    Return as needed, or in 1 year for annual examination.    Chelsey Steele MD, MSCI    [facility]/OB[facility]N

## 2021-05-19 ENCOUNTER — OFFICE VISIT (OUTPATIENT)
Dept: OBGYN | Facility: CLINIC | Age: 41
End: 2021-05-19
Attending: OBSTETRICS & GYNECOLOGY
Payer: COMMERCIAL

## 2021-05-19 VITALS
HEIGHT: 67 IN | DIASTOLIC BLOOD PRESSURE: 77 MMHG | HEART RATE: 87 BPM | SYSTOLIC BLOOD PRESSURE: 112 MMHG | WEIGHT: 292 LBS | BODY MASS INDEX: 45.83 KG/M2

## 2021-05-19 DIAGNOSIS — Z30.011 ENCOUNTER FOR INITIAL PRESCRIPTION OF CONTRACEPTIVE PILLS: Primary | ICD-10-CM

## 2021-05-19 PROCEDURE — 99213 OFFICE O/P EST LOW 20 MIN: CPT | Performed by: OBSTETRICS & GYNECOLOGY

## 2021-05-19 PROCEDURE — G0463 HOSPITAL OUTPT CLINIC VISIT: HCPCS

## 2021-05-19 RX ORDER — PNV NO.95/FERROUS FUM/FOLIC AC 28MG-0.8MG
1 TABLET ORAL DAILY
Qty: 90 TABLET | Refills: 4 | Status: SHIPPED | OUTPATIENT
Start: 2021-05-19

## 2021-05-19 RX ORDER — NORGESTIMATE AND ETHINYL ESTRADIOL 0.25-0.035
1 KIT ORAL DAILY
Qty: 84 TABLET | Refills: 4 | Status: SHIPPED | OUTPATIENT
Start: 2021-05-19

## 2021-05-19 ASSESSMENT — ANXIETY QUESTIONNAIRES
5. BEING SO RESTLESS THAT IT IS HARD TO SIT STILL: NOT AT ALL
3. WORRYING TOO MUCH ABOUT DIFFERENT THINGS: NOT AT ALL
2. NOT BEING ABLE TO STOP OR CONTROL WORRYING: NOT AT ALL
7. FEELING AFRAID AS IF SOMETHING AWFUL MIGHT HAPPEN: NOT AT ALL
6. BECOMING EASILY ANNOYED OR IRRITABLE: SEVERAL DAYS
GAD7 TOTAL SCORE: 1
1. FEELING NERVOUS, ANXIOUS, OR ON EDGE: NOT AT ALL

## 2021-05-19 ASSESSMENT — MIFFLIN-ST. JEOR: SCORE: 2032.13

## 2021-05-19 ASSESSMENT — PATIENT HEALTH QUESTIONNAIRE - PHQ9: 5. POOR APPETITE OR OVEREATING: NOT AT ALL

## 2021-05-19 NOTE — PATIENT INSTRUCTIONS
Nice to see you again!     Please use a back up method (condoms) for the next week until the birth control pill is effective. You may start the pill today.

## 2021-05-19 NOTE — LETTER
"2021       RE: Anjali Coker  [address] [facility] [address]     Dear Colleague,    Thank you for referring your patient, Anjali Coker, to the [facility] [place] at [facility]. Please see a copy of my visit note below.    [facility]  Gynecology Postoperative Visit    SUBJECTIVE    Anjali Coker is a 41 year old  who is here for a postoperative visit. She underwent a hysteroscopy for removal of a retained IUD fragment on 21. Since then, she notes she is doing well and has no concerns. She would like to discuss contraception, but does not want another IUD.    Her LMP is: Patient's last menstrual period was 2021 (exact date).      REVIEW OF SYSTEMS  A 10 point review of systems including Constitutional, Eyes, Respiratory, Cardiovascular, Gastroenterology, Genitourinary, Integumentary, Musculoskeletal, and Psychiatric, were all negative, except for pertinent positives noted in the above HPI.    OBJECTIVE  /77 (BP Location: Left arm, Patient Position: Chair)   Pulse 87   Ht 1.702 m (5' 7\")   Wt 132.5 kg (292 lb)   LMP 2021 (Approximate)   BMI 45.73 kg/m      General: Alert, without distress  HEENT: normocephalic, without obvious abnormality   Extremities: normal    ASSESSMENT  Anjali Coker is a 41 year old  who is here for a postoperative visit. She is 2 weeks after a hysteroscopy to remove a retained IUD fragment.    PLAN  -Anjali desires to use oral contraceptive pills, as she uses many other medications by mouth and manages them easily. She does not smoke, have a history of blood clots, have high blood pressure, or migraines with aura, and so is at baseline risk for blood clots in the legs (DVT). We discussed signs of blood clot including pain and swelling in the leg, especially if only one side. She may start the pill as soon as she gets if from the pharmacy. We discussed " the need for backup method (condoms) x 1 week upon starting until the method is effective. Prescription sent to preferred pharmacy, along with requested prescription for a prenatal vitamin in order to help with hair and nail growth.  -paperwork for [employer] filled out upon end of clinical encounter.    Return as needed, or in 1 year for annual examination.    Chelsey Steele MD, MSCI    [facility]/OBGYN

## 2021-05-20 ASSESSMENT — ANXIETY QUESTIONNAIRES: GAD7 TOTAL SCORE: 1

## 2021-05-20 NOTE — PROGRESS NOTES
Assessment & Plan     Weight gain  Family history of diabetes mellitus  Will check A1c today. If elevated consider GLP-1 to assist with weight loss also.  - Hemoglobin A1c    Refills provided:  - acetaminophen (TYLENOL) 325 MG tablet  Dispense: 50 tablet; Refill: 0  - ibuprofen (ADVIL/MOTRIN) 800 MG tablet  Dispense: 30 tablet; Refill: 0  ADHD - stable at lower dose Straterra (decreased since her manic episode), doing well  - atomoxetine (STRATTERA) 40 MG capsule  Dispense: 30 capsule; Refill: 0    - melatonin 3 MG tablet  Dispense: 14 tablet; Refill: 0    Ingrown toenail of both feet  Referral placed for procedure clinic  - [facility]\"" INTERNAL REFERRAL             No follow-ups on file.    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 Wonders about DM for herself.    Mental health - stable now. Lots of stressors prior.  Decreasing zyprexa, going to [place].    Ingrown toenails    Review of Systems   Constitutional, HEENT, cardiovascular, pulmonary, gi and gu systems are negative, except as otherwise noted.      Objective    /73   Pulse 107   Temp 99  F (37.2  C) (Oral)   Resp 16   Wt 132.6 kg (292 lb 6.4 oz)   LMP 04/25/2021 (Exact Date)   SpO2 96%   BMI 45.80 kg/m    Body mass index is 45.8 kg/m .  Physical Exam  Musculoskeletal:      Comments: Ingrown toenails - lateral 1st digit both feet                ----- Service Performed and Documented by Resident or Fellow ------

## 2021-05-21 ENCOUNTER — OFFICE VISIT (OUTPATIENT)
Dept: FAMILY MEDICINE | Facility: CLINIC | Age: 41
End: 2021-05-21
Payer: COMMERCIAL

## 2021-05-21 VITALS
BODY MASS INDEX: 45.8 KG/M2 | SYSTOLIC BLOOD PRESSURE: 111 MMHG | WEIGHT: 292.4 LBS | OXYGEN SATURATION: 96 % | DIASTOLIC BLOOD PRESSURE: 73 MMHG | TEMPERATURE: 99 F | HEART RATE: 107 BPM | RESPIRATION RATE: 16 BRPM

## 2021-05-21 DIAGNOSIS — T83.39XA RETAINED INTRAUTERINE CONTRACEPTIVE DEVICE (IUD): ICD-10-CM

## 2021-05-21 DIAGNOSIS — L60.0 INGROWN TOENAIL OF BOTH FEET: ICD-10-CM

## 2021-05-21 DIAGNOSIS — F31.9 BIPOLAR DISORDER WITH PSYCHOTIC FEATURES (H): ICD-10-CM

## 2021-05-21 DIAGNOSIS — R63.5 WEIGHT GAIN: Primary | ICD-10-CM

## 2021-05-21 DIAGNOSIS — Z83.3 FAMILY HISTORY OF DIABETES MELLITUS: ICD-10-CM

## 2021-05-21 DIAGNOSIS — F90.9 ATTENTION DEFICIT HYPERACTIVITY DISORDER (ADHD), UNSPECIFIED ADHD TYPE: ICD-10-CM

## 2021-05-21 LAB — HBA1C MFR BLD: 5.5 % (ref 0–5.6)

## 2021-05-21 PROCEDURE — 36415 COLL VENOUS BLD VENIPUNCTURE: CPT | Performed by: FAMILY MEDICINE

## 2021-05-21 PROCEDURE — 83036 HEMOGLOBIN GLYCOSYLATED A1C: CPT | Performed by: FAMILY MEDICINE

## 2021-05-21 PROCEDURE — 99214 OFFICE O/P EST MOD 30 MIN: CPT | Mod: GC | Performed by: STUDENT IN AN ORGANIZED HEALTH CARE EDUCATION/TRAINING PROGRAM

## 2021-05-21 RX ORDER — ATOMOXETINE 40 MG/1
40 CAPSULE ORAL DAILY
Qty: 30 CAPSULE | Refills: 0 | Status: SHIPPED | OUTPATIENT
Start: 2021-05-21

## 2021-05-21 RX ORDER — IBUPROFEN 800 MG/1
800 TABLET, FILM COATED ORAL EVERY 6 HOURS PRN
Qty: 30 TABLET | Refills: 0 | Status: SHIPPED | OUTPATIENT
Start: 2021-05-21

## 2021-05-21 RX ORDER — ACETAMINOPHEN 325 MG/1
975 TABLET ORAL EVERY 6 HOURS PRN
Qty: 50 TABLET | Refills: 0 | Status: SHIPPED | OUTPATIENT
Start: 2021-05-21

## 2021-05-21 RX ORDER — LANOLIN ALCOHOL/MO/W.PET/CERES
3 CREAM (GRAM) TOPICAL
Qty: 14 TABLET | Refills: 0 | Status: SHIPPED | OUTPATIENT
Start: 2021-05-21

## 2021-10-11 ENCOUNTER — HEALTH MAINTENANCE LETTER (OUTPATIENT)
Age: 41
End: 2021-10-11

## 2022-03-27 ENCOUNTER — HEALTH MAINTENANCE LETTER (OUTPATIENT)
Age: 42
End: 2022-03-27

## 2022-09-25 ENCOUNTER — HEALTH MAINTENANCE LETTER (OUTPATIENT)
Age: 42
End: 2022-09-25

## 2023-05-08 ENCOUNTER — HEALTH MAINTENANCE LETTER (OUTPATIENT)
Age: 43
End: 2023-05-08

## 2024-03-02 ENCOUNTER — HEALTH MAINTENANCE LETTER (OUTPATIENT)
Age: 44
End: 2024-03-02

## 2024-05-11 ENCOUNTER — HEALTH MAINTENANCE LETTER (OUTPATIENT)
Age: 44
End: 2024-05-11

## (undated) DEVICE — Device

## (undated) DEVICE — SOL NACL 0.9% IRRIG 3000ML BAG 2B7477

## (undated) DEVICE — GLOVE PROTEXIS BLUE W/NEU-THERA 7.0  2D73EB70

## (undated) DEVICE — LINEN GOWN X4 5410

## (undated) DEVICE — PAD CHUX UNDERPAD 30X36" P3036C

## (undated) DEVICE — LINEN TOWEL PACK X5 5464

## (undated) DEVICE — GLOVE PROTEXIS W/NEU-THERA 6.5  2D73TE65

## (undated) DEVICE — SEAL SET MYOSURE ROD LENS SCOPE SINGLE USE 40-902

## (undated) DEVICE — KIT PROCEDURE FLUENT IN/OUT FLOWPAK TISS TRAP FLT-112S

## (undated) DEVICE — SOL NACL 0.9% IRRIG 1000ML BOTTLE 2F7124

## (undated) DEVICE — STRAP KNEE/BODY 31143004

## (undated) DEVICE — ESU HOLDER LAP INST DISP YELLOW SHORT 250MM H-PRO-250

## (undated) DEVICE — SOL WATER IRRIG 1000ML BOTTLE 2F7114

## (undated) RX ORDER — ACETAMINOPHEN 325 MG/1
TABLET ORAL
Status: DISPENSED
Start: 2021-05-05

## (undated) RX ORDER — FENTANYL CITRATE 50 UG/ML
INJECTION, SOLUTION INTRAMUSCULAR; INTRAVENOUS
Status: DISPENSED
Start: 2021-05-05

## (undated) RX ORDER — LIDOCAINE HYDROCHLORIDE 10 MG/ML
INJECTION, SOLUTION EPIDURAL; INFILTRATION; INTRACAUDAL; PERINEURAL
Status: DISPENSED
Start: 2021-05-05

## (undated) RX ORDER — LIDOCAINE HYDROCHLORIDE AND EPINEPHRINE 10; 10 MG/ML; UG/ML
INJECTION, SOLUTION INFILTRATION; PERINEURAL
Status: DISPENSED
Start: 2021-05-05